# Patient Record
Sex: FEMALE | Race: BLACK OR AFRICAN AMERICAN | Employment: OTHER | URBAN - METROPOLITAN AREA
[De-identification: names, ages, dates, MRNs, and addresses within clinical notes are randomized per-mention and may not be internally consistent; named-entity substitution may affect disease eponyms.]

---

## 2017-02-13 DIAGNOSIS — E78.00 HYPERCHOLESTEREMIA: Chronic | ICD-10-CM

## 2017-02-13 RX ORDER — LOSARTAN POTASSIUM 100 MG/1
TABLET ORAL
Qty: 30 TAB | Refills: 4 | Status: SHIPPED | OUTPATIENT
Start: 2017-02-13 | End: 2017-04-28 | Stop reason: SDUPTHER

## 2017-02-13 RX ORDER — PROPRANOLOL HYDROCHLORIDE 120 MG/1
CAPSULE, EXTENDED RELEASE ORAL
Qty: 30 CAP | Refills: 4 | Status: SHIPPED | OUTPATIENT
Start: 2017-02-13 | End: 2017-09-28

## 2017-04-11 ENCOUNTER — OFFICE VISIT (OUTPATIENT)
Dept: INTERNAL MEDICINE CLINIC | Age: 82
End: 2017-04-11

## 2017-04-11 VITALS
TEMPERATURE: 97.6 F | RESPIRATION RATE: 18 BRPM | HEIGHT: 59 IN | HEART RATE: 53 BPM | DIASTOLIC BLOOD PRESSURE: 87 MMHG | WEIGHT: 184.6 LBS | SYSTOLIC BLOOD PRESSURE: 243 MMHG | BODY MASS INDEX: 37.21 KG/M2 | OXYGEN SATURATION: 100 %

## 2017-04-11 DIAGNOSIS — E78.00 HYPERCHOLESTEREMIA: Chronic | ICD-10-CM

## 2017-04-11 DIAGNOSIS — I10 ESSENTIAL HYPERTENSION: Primary | Chronic | ICD-10-CM

## 2017-04-11 RX ORDER — CLONIDINE HYDROCHLORIDE 0.1 MG/1
0.1 TABLET ORAL ONCE
Qty: 1 TAB | Refills: 0
Start: 2017-04-11 | End: 2017-04-11

## 2017-04-11 NOTE — PROGRESS NOTES
HISTORY OF PRESENT ILLNESS  Argenis Del Rio is a 80 y.o. female. Visit Vitals    BP (!) 243/87    Pulse (!) 53    Temp 97.6 °F (36.4 °C) (Oral)    Resp 18    Ht 4' 11\" (1.499 m)    Wt 184 lb 9.6 oz (83.7 kg)    SpO2 100%    BMI 37.28 kg/m2       HPI Comments: Has a new pharmacy. Medication Evaluation   The history is provided by the patient. This is a new problem. Pertinent negatives include no chest pain, no headaches and no shortness of breath. Hypertension    The history is provided by the patient (didn't take her morning meds). This is a chronic problem. The current episode started more than 1 week ago. The problem has not changed since onset. Pertinent negatives include no chest pain, no palpitations, no headaches, no dizziness and no shortness of breath. Risk factors include postmenopause, obesity, a sedentary lifestyle and hypertension. Review of Systems   Constitutional: Negative. Respiratory: Negative for shortness of breath. Cardiovascular: Negative for chest pain and palpitations. Neurological: Negative for dizziness and headaches. Physical Exam   Constitutional: She is oriented to person, place, and time. She appears well-developed and well-nourished. No distress. Cardiovascular: Normal rate and regular rhythm. Pulmonary/Chest: Effort normal and breath sounds normal.   Musculoskeletal: She exhibits no edema. Neurological: She is alert and oriented to person, place, and time. Skin: Skin is warm and dry. She is not diaphoretic. Psychiatric: She has a normal mood and affect. Nursing note and vitals reviewed. ASSESSMENT and PLAN    ICD-10-CM ICD-9-CM    1. Essential hypertension I10 401.9 cloNIDine HCl (CATAPRES) 0.1 mg tablet      METABOLIC PANEL, COMPREHENSIVE      LIPID PANEL      URINALYSIS W/ RFLX MICROSCOPIC   2. Hypercholesteremia E78.00 272.0 LIPID PANEL     BP systolic dangerously high. Will give dose of clonidine in the office.      Reinforced the importance of taking her meds    Update lab--needs to go to [facility] per pt    F/u one month        See nurse note.  BP responded to clonidine in office--186/64 so pt allowed to leave with instructions to take her medication when she gets home

## 2017-04-11 NOTE — MR AVS SNAPSHOT
Visit Information Date & Time Provider Department Dept. Phone Encounter #  
 4/11/2017 11:15 AM Tatyana Carlson, [address] [id number] Follow-up Instructions Return in about 1 month (around 5/11/2017) for htn. Upcoming Health Maintenance Date Due  
 GLAUCOMA SCREENING Q2Y 8/15/1997 ZOSTER VACCINE AGE 60> 8/1/2017* DTaP/Tdap/Td series (1 - Tdap) 8/1/2017* Pneumococcal 65+ Low/Medium Risk (2 of 2 - PPSV23) 9/13/2017 MEDICARE YEARLY EXAM 9/14/2017 *Topic was postponed. The date shown is not the original due date. Allergies as of 4/11/2017  Review Complete On: 4/11/2017 By: Tatyana Carlson MD  
  
 Severity Noted Reaction Type Reactions Penicillins  08/01/2016    Other (comments) Passed out Statins-hmg-coa Reductase Inhibitors  08/01/2016    Myalgia Tried multiple statins Current Immunizations  Never Reviewed No immunizations on file. Not reviewed this visit You Were Diagnosed With   
  
 Codes Comments Essential hypertension    -  Primary ICD-10-CM: I10 
ICD-9-CM: 401.9 Hypercholesteremia     ICD-10-CM: E78.00 ICD-9-CM: 272.0 Vitals BP Pulse Temp Resp Height(growth percentile) Weight(growth percentile) (!) 243/87 (!) 53 97.6 °F (36.4 °C) (Oral) 18 4' 11\" (1.499 m) 184 lb 9.6 oz (83.7 kg) SpO2 BMI OB Status Smoking Status 100% 37.28 kg/m2 Hysterectomy Former Smoker Vitals History BMI and BSA Data Body Mass Index Body Surface Area  
 37.28 kg/m 2 1.87 m 2 Preferred Pharmacy Pharmacy Name Phone [id number] [address] [facility], [address] [place] [address] [phone] Your Updated Medication List  
  
   
This list is accurate as of: 4/11/17 12:03 PM.  Always use your most recent med list.  
  
  
  
  
 aspirin delayed-release 81 mg tablet Take  by mouth. Pt takes 2-3 times a week  
  
 cloNIDine HCl 0.1 mg tablet Commonly known as:  CATAPRES Take 1 Tab by mouth once for 1 dose. Indications: hypertension  
  
 hydrALAZINE 50 mg tablet Commonly known as:  APRESOLINE Take 1 Tab by mouth two (2) times a day. Indications: HYPERTENSION  
  
 losartan 100 mg tablet Commonly known as:  COZAAR  
TAKE 1 TAB BY MOUTH DAILY. INDICATIONS: HYPERTENSION  
  
 multivitamin tablet Commonly known as:  ONE A DAY Take  by Mouth Once a Day. omega-3 fatty acids-vitamin e 1,000 mg Cap  
1,000 mg.  
  
 prednisoLONE acetate 1 % ophthalmic suspension Commonly known as:  PRED FORTE  
  
 propranolol  mg SR capsule Commonly known as:  INDERAL LA  
TAKE 1 CAP BY MOUTH DAILY. INDICATIONS: HYPERTENSION Follow-up Instructions Return in about 1 month (around 5/11/2017) for htn. To-Do List   
 04/11/2017 Lab:  LIPID PANEL   
  
 04/11/2017 Lab:  METABOLIC PANEL, COMPREHENSIVE   
  
 04/11/2017 Lab:  URINALYSIS W/ RFLX MICROSCOPIC Introducing [facility] & HEALTH SERVICES! [facility] introduces Propable patient portal. Now you can access parts of your medical record, email your doctor's office, and request medication refills online. 1. In your internet browser, go to [email or web address] 2. Click on the First Time User? Click Here link in the Sign In box. You will see the New Member Sign Up page. 3. Enter your Propable Access Code exactly as it appears below. You will not need to use this code after youve completed the sign-up process. If you do not sign up before the expiration date, you must request a new code. · Propable Access Code: Halima Wood Expires: 7/10/2017 12:03 PM 
 
4. Enter the last four digits of your Social Security Number (xxxx) and Date of Birth (mm/dd/yyyy) as indicated and click Submit. You will be taken to the next sign-up page. 5. Create a Propable ID.  This will be your Propable login ID and cannot be changed, so think of one that is secure and easy to remember. 6. Create a Sookbox password. You can change your password at any time. 7. Enter your Password Reset Question and Answer. This can be used at a later time if you forget your password. 8. Enter your e-mail address. You will receive e-mail notification when new information is available in [address] 9. Click Sign Up. You can now view and download portions of your medical record. 10. Click the Download Summary menu link to download a portable copy of your medical information. If you have questions, please visit the Frequently Asked Questions section of the Sookbox website. Remember, Sookbox is NOT to be used for urgent needs. For medical emergencies, dial 911. Now available from your iPhone and Android! Please provide this summary of care documentation to your next provider. If you have any questions after today's visit, please call [phone].

## 2017-04-11 NOTE — PROGRESS NOTES
Chief Complaint   Patient presents with    Medication Evaluation       Pt preferred language for health care discussion is english. Is someone accompanying this pt? no    Is the patient using any DME equipment during OV? no    Depression Screening completed. Yes    Learning Assessment completed. Yes    Abuse Screening completed. Yes    Health Maintenance reviewed and discussed per provider. Yes    Pt is due for Bone Density, Eye exam.  Please order/place referral if appropriate. Advance Directive:  1. Do you have an advance directive in place? Patient Reply:no    2. If not, would you like material regarding how to put one in place? Patient Reply: No    Coordination of Care:  1. Have you been to the ER, urgent care clinic since your last visit? Hospitalized since your last visit? no    2. Have you seen or consulted any other health care providers outside of the [facility] since your last visit? Include any pap smears or colon screening. no      Patient given 0.1 mg of Clonidine, BP rechecked in 15 min 207/70. Rechecked in another 10 min 233/68. Another 0.1 mg Clonidine given, BP rechecked in 25 min 186/64. Pt released from the clinic.

## 2017-05-11 ENCOUNTER — OFFICE VISIT (OUTPATIENT)
Dept: INTERNAL MEDICINE CLINIC | Age: 82
End: 2017-05-11

## 2017-05-11 VITALS
DIASTOLIC BLOOD PRESSURE: 78 MMHG | HEART RATE: 52 BPM | BODY MASS INDEX: 37.9 KG/M2 | RESPIRATION RATE: 18 BRPM | WEIGHT: 188 LBS | HEIGHT: 59 IN | OXYGEN SATURATION: 99 % | TEMPERATURE: 97.7 F | SYSTOLIC BLOOD PRESSURE: 214 MMHG

## 2017-05-11 DIAGNOSIS — E78.00 HYPERCHOLESTEREMIA: Chronic | ICD-10-CM

## 2017-05-11 DIAGNOSIS — I10 ESSENTIAL HYPERTENSION: Primary | Chronic | ICD-10-CM

## 2017-05-11 RX ORDER — FUROSEMIDE 20 MG/1
20 TABLET ORAL DAILY
Qty: 30 TAB | Refills: 5 | Status: SHIPPED | OUTPATIENT
Start: 2017-05-11 | End: 2017-06-22

## 2017-05-11 NOTE — PROGRESS NOTES
Chief Complaint   Patient presents with    Hypertension       Pt preferred language for health care discussion is english. Is someone accompanying this pt? no    Is the patient using any DME equipment during OV? no    Depression Screening completed. Yes    Learning Assessment completed. Yes    Abuse Screening completed. Yes    Health Maintenance reviewed and discussed per provider. Yes    Pt is due for Eye exam.  Please order/place referral if appropriate. Advance Directive:  1. Do you have an advance directive in place? Patient Reply:no    2. If not, would you like material regarding how to put one in place? Patient Reply: No    Coordination of Care:  1. Have you been to the ER, urgent care clinic since your last visit? Hospitalized since your last visit? no    2. Have you seen or consulted any other health care providers outside of the [address] [place] [address] since your last visit? Include any pap smears or colon screening.  no

## 2017-05-11 NOTE — PROGRESS NOTES
HISTORY OF PRESENT ILLNESS  Queen Jaiden is a 80 y.o. female. Blood pressure (!) 214/78, pulse (!) 52, temperature 97.7 °F (36.5 °C), temperature source Oral, resp. rate 18, height 4' 11\" (1.499 m), weight 188 lb (85.3 kg), SpO2 99 %. HPI Comments: She was seeing Dr. Gaitan Situ  We have tried to get her records and have been unable to get them. So we have no way of knowing what she did --what meds she has tried, what tests she had done, what health maintenance she had    She had lab done via [facility]. None done via [facility] or [facility]. Hypertension    The history is provided by the patient. This is a chronic problem. The current episode started more than 1 week ago. The problem has not changed since onset. Pertinent negatives include no chest pain, no blurred vision, no headaches, no dizziness and no shortness of breath. There are no associated agents to hypertension. Risk factors include postmenopause, obesity, a sedentary lifestyle and family history. Review of Systems   Constitutional: Negative. Eyes: Negative for blurred vision. Respiratory: Negative for shortness of breath. Cardiovascular: Negative. Negative for chest pain. Neurological: Negative for dizziness, focal weakness and headaches. Hydralazine makes her a little dizzy       Physical Exam   Constitutional: She is oriented to person, place, and time. She appears well-developed and well-nourished. No distress. Cardiovascular: Normal rate and regular rhythm. Pulmonary/Chest: Effort normal and breath sounds normal.   Musculoskeletal: She exhibits edema. Neurological: She is alert and oriented to person, place, and time. Skin: Skin is warm and dry. She is not diaphoretic. Psychiatric: She has a normal mood and affect. Nursing note and vitals reviewed. ASSESSMENT and PLAN    ICD-10-CM ICD-9-CM    1.  Essential hypertension J08 232.0 METABOLIC PANEL, COMPREHENSIVE      LIPID PANEL      URINALYSIS W/ RFLX MICROSCOPIC      TSH AND FREE T4      MAGNESIUM      AMB POC EKG ROUTINE W/ 12 LEADS, INTER & REP   2. Hypercholesteremia R70.16 930.4 METABOLIC PANEL, COMPREHENSIVE      LIPID PANEL      AMB POC EKG ROUTINE W/ 12 LEADS, INTER & REP       Since we have no lab or old records, we will have to start over. Order lab and EKG today  At Dr. Rock Jones office her BPs were done by hand  Has isolated systolic BP. Stop the hydralazine--she did not take it regularly anyway due to dizziness  Consider amlodipine or procardia. Or minoxidil    But she is not on a diuretic--so will start lasix 20 mg daily    Discussed weight, lifestyle, diet and exercise. She says she has had a weight problem all her life    F/u 6 weeks for recheck an BP           Addendum--her EKG does show bradycardia and probable LVH. She is on propranolol.  This may need to be adjusted, but pt denies any sxs currently

## 2017-05-11 NOTE — MR AVS SNAPSHOT
Visit Information Date & Time Provider Department Dept. Phone Encounter #  
 5/11/2017 11:15 AM Renetta Poole, [address] [facility] [id number] Follow-up Instructions Return in about 6 weeks (around 6/22/2017) for htn, cholesterol. Upcoming Health Maintenance Date Due  
 GLAUCOMA SCREENING Q2Y 8/15/1997 ZOSTER VACCINE AGE 60> 8/1/2017* DTaP/Tdap/Td series (1 - Tdap) 8/1/2017* INFLUENZA AGE 9 TO ADULT 8/1/2017 Pneumococcal 65+ Low/Medium Risk (2 of 2 - PPSV23) 9/13/2017 MEDICARE YEARLY EXAM 9/14/2017 *Topic was postponed. The date shown is not the original due date. Allergies as of 5/11/2017  Review Complete On: 5/11/2017 By: Renetta Poole MD  
  
 Severity Noted Reaction Type Reactions Penicillins  08/01/2016    Other (comments) Passed out Statins-hmg-coa Reductase Inhibitors  08/01/2016    Myalgia Tried multiple statins Current Immunizations  Never Reviewed No immunizations on file. Not reviewed this visit You Were Diagnosed With   
  
 Codes Comments Essential hypertension    -  Primary ICD-10-CM: I10 
ICD-9-CM: 401.9 Hypercholesteremia     ICD-10-CM: E78.00 ICD-9-CM: 272.0 Vitals BP Pulse Temp Resp Height(growth percentile) Weight(growth percentile) (!) 226/77 (!) 52 97.7 °F (36.5 °C) (Oral) 18 4' 11\" (1.499 m) 188 lb (85.3 kg) SpO2 BMI OB Status Smoking Status 99% 37.97 kg/m2 Hysterectomy Former Smoker Vitals History BMI and BSA Data Body Mass Index Body Surface Area  
 37.97 kg/m 2 1.88 m 2 Preferred Pharmacy Pharmacy Name Phone [id number], [address] [place] [address] 29 [phone] Your Updated Medication List  
  
   
This list is accurate as of: 5/11/17 11:20 AM.  Always use your most recent med list.  
  
  
  
  
 aspirin delayed-release 81 mg tablet Take  by mouth. Pt takes 2-3 times a week  
  
 furosemide 20 mg tablet Commonly known as:  LASIX Take 1 Tab by mouth daily. losartan 100 mg tablet Commonly known as:  COZAAR Take 1 Tab by mouth daily. multivitamin tablet Commonly known as:  ONE A DAY Take  by Mouth Once a Day. omega-3 fatty acids-vitamin e 1,000 mg Cap  
1,000 mg.  
  
 prednisoLONE acetate 1 % ophthalmic suspension Commonly known as:  PRED FORTE  
  
 propranolol  mg SR capsule Commonly known as:  INDERAL LA  
TAKE 1 CAP BY MOUTH DAILY. INDICATIONS: HYPERTENSION Prescriptions Sent to Pharmacy Refills  
 furosemide (LASIX) 20 mg tablet 5 Sig: Take 1 Tab by mouth daily. Class: Normal  
 Pharmacy: [address] [place] [address], [address] [place] [address], 29  #: [phone] Route: Oral  
  
We Performed the Following AMB POC EKG ROUTINE W/ 12 LEADS, INTER & REP [62836 CPT(R)] Follow-up Instructions Return in about 6 weeks (around 6/22/2017) for htn, cholesterol. To-Do List   
 05/11/2017 Lab:  LIPID PANEL   
  
 05/11/2017 Lab:  MAGNESIUM   
  
 05/11/2017 Lab:  METABOLIC PANEL, COMPREHENSIVE   
  
 05/11/2017 Lab:  TSH AND FREE T4   
  
 05/11/2017 Lab:  URINALYSIS W/ RFLX MICROSCOPIC Introducing [facility] & HEALTH SERVICES! Katheryn Roger introduces VipVenta patient portal. Now you can access parts of your medical record, email your doctor's office, and request medication refills online. 1. In your internet browser, go to [email or web address] 2. Click on the First Time User? Click Here link in the Sign In box. You will see the New Member Sign Up page. 3. Enter your VipVenta Access Code exactly as it appears below. You will not need to use this code after youve completed the sign-up process. If you do not sign up before the expiration date, you must request a new code. · VipVenta Access Code: Nilton Morelos Expires: 7/10/2017 12:03 PM 
 
4. Enter the last four digits of your Social Security Number (xxxx) and Date of Birth (mm/dd/yyyy) as indicated and click Submit. You will be taken to the next sign-up page. 5. Create a Otelic ID. This will be your Otelic login ID and cannot be changed, so think of one that is secure and easy to remember. 6. Create a Otelic password. You can change your password at any time. 7. Enter your Password Reset Question and Answer. This can be used at a later time if you forget your password. 8. Enter your e-mail address. You will receive e-mail notification when new information is available in [address] 9. Click Sign Up. You can now view and download portions of your medical record. 10. Click the Download Summary menu link to download a portable copy of your medical information. If you have questions, please visit the Frequently Asked Questions section of the Otelic website. Remember, Otelic is NOT to be used for urgent needs. For medical emergencies, dial 911. Now available from your iPhone and Android! Please provide this summary of care documentation to your next provider. If you have any questions after today's visit, please call [phone].

## 2017-06-22 ENCOUNTER — OFFICE VISIT (OUTPATIENT)
Dept: INTERNAL MEDICINE CLINIC | Age: 82
End: 2017-06-22

## 2017-06-22 VITALS
HEIGHT: 59 IN | HEART RATE: 51 BPM | RESPIRATION RATE: 16 BRPM | SYSTOLIC BLOOD PRESSURE: 171 MMHG | DIASTOLIC BLOOD PRESSURE: 68 MMHG | WEIGHT: 187 LBS | TEMPERATURE: 97.3 F | BODY MASS INDEX: 37.7 KG/M2

## 2017-06-22 DIAGNOSIS — I10 ESSENTIAL HYPERTENSION: Primary | Chronic | ICD-10-CM

## 2017-06-22 DIAGNOSIS — E78.00 HYPERCHOLESTEREMIA: Chronic | ICD-10-CM

## 2017-06-22 RX ORDER — AMLODIPINE AND VALSARTAN 5; 320 MG/1; MG/1
1 TABLET ORAL DAILY
Qty: 30 TAB | Refills: 5 | Status: SHIPPED | OUTPATIENT
Start: 2017-06-22 | End: 2017-09-14 | Stop reason: SDUPTHER

## 2017-06-22 NOTE — PROGRESS NOTES
Chief Complaint   Patient presents with    Hypertension       Pt preferred language for health care discussion is [place]. Is someone accompanying this pt? no    Is the patient using any DME equipment during [facility]? cane    Depression Screening:  PHQ over the last two weeks 6/22/2017 5/11/2017 4/11/2017 9/13/2016 8/1/2016   Little interest or pleasure in doing things Not at all Not at all Not at all Not at all Not at all   Feeling down, depressed or hopeless Not at all Not at all Not at all Not at all Not at all   Total Score PHQ 2 0 0 0 0 0       Learning Assessment:  Learning Assessment 8/1/2016   PRIMARY LEARNER Patient   HIGHEST LEVEL OF EDUCATION - PRIMARY LEARNER  GRADUATED HIGH SCHOOL OR GED   PRIMARY LANGUAGE ENGLISH   LEARNER PREFERENCE PRIMARY DEMONSTRATION   ANSWERED BY MARGIE Robbins   RELATIONSHIP SELF       Abuse Screening:  Abuse Screening Questionnaire 8/1/2016   Do you ever feel afraid of your partner? N   Are you in a relationship with someone who physically or mentally threatens you? N   Is it safe for you to go home? Y       Fall Risk  Fall Risk Assessment, last 12 mths 6/22/2017 5/11/2017 4/11/2017 9/13/2016 8/1/2016   Able to walk? Yes Yes Yes Yes Yes   Fall in past 12 months? No No No No Yes   Fall with injury? - - - - No   Number of falls in past 12 months - - - - 3   Fall Risk Score - - - - 3         Health Maintenance reviewed and discussed per provider. Yes    Pt is due for Eye exam (form faxed to Dr. Silvestre Rodriguez). Please order/place referral if appropriate. Advance Directive:  1. Do you have an advance directive in place? Patient Reply:Yes, daughters have    2. If not, would you like material regarding how to put one in place? Patient Reply: no    Coordination of Care:  1. Have you been to the ER, urgent care clinic since your last visit? Hospitalized since your last visit? no    2.  Have you seen or consulted any other health care providers outside of the [address] [place] [address] since your last visit? Include any pap smears or colon screening.  no

## 2017-06-22 NOTE — PROGRESS NOTES
HISTORY OF PRESENT ILLNESS  Micheal Conde is a 80 y.o. female. Visit Vitals    /68    Pulse (!) 51    Temp 97.3 °F (36.3 °C) (Oral)    Resp 16    Ht 4' 11\" (1.499 m)    Wt 187 lb (84.8 kg)    BMI 37.77 kg/m2       HPI Comments: Pt says she had a reaction to furosemide--she had swelling of her hands. Her BP is better this time. We have never gotten records from Dr. Whitman Meigs . Hypertension    The history is provided by the patient. This is a chronic problem. The current episode started more than 1 week ago. The problem has been gradually improving. Pertinent negatives include no chest pain, no palpitations, no headaches and no dizziness. Risk factors include dyslipidemia and postmenopause. Review of Systems   Constitutional: Negative for chills and fever. Cardiovascular: Negative for chest pain and palpitations. Neurological: Negative for dizziness and headaches. Physical Exam   Constitutional: She is oriented to person, place, and time. She appears well-developed and well-nourished. No distress. Cardiovascular: Normal rate and regular rhythm. Pulmonary/Chest: Effort normal and breath sounds normal.   Musculoskeletal: She exhibits edema. Neurological: She is alert and oriented to person, place, and time. Skin: Skin is warm and dry. She is not diaphoretic. Psychiatric: She has a normal mood and affect. Nursing note and vitals reviewed. ASSESSMENT and PLAN    ICD-10-CM ICD-9-CM    1. Essential hypertension I10 401.9 amLODIPine-valsartan (EXFORGE) 5-320 mg per tablet   2. Hypercholesteremia E78.00 272.0        BP up but better.   Will try changing to exforge from losartan--one pill swap which may work better for her    Continue same otherwise    F/u 6-8 weeks for recheck on BP

## 2017-08-03 ENCOUNTER — OFFICE VISIT (OUTPATIENT)
Dept: INTERNAL MEDICINE CLINIC | Age: 82
End: 2017-08-03

## 2017-08-03 VITALS
WEIGHT: 184 LBS | RESPIRATION RATE: 16 BRPM | DIASTOLIC BLOOD PRESSURE: 85 MMHG | HEIGHT: 59 IN | OXYGEN SATURATION: 99 % | BODY MASS INDEX: 37.09 KG/M2 | SYSTOLIC BLOOD PRESSURE: 176 MMHG | TEMPERATURE: 96.2 F | HEART RATE: 58 BPM

## 2017-08-03 DIAGNOSIS — I10 ESSENTIAL HYPERTENSION: Primary | Chronic | ICD-10-CM

## 2017-08-03 DIAGNOSIS — E78.00 HYPERCHOLESTEREMIA: Chronic | ICD-10-CM

## 2017-08-03 DIAGNOSIS — Z79.899 MEDICATION MANAGEMENT: ICD-10-CM

## 2017-08-03 RX ORDER — FUROSEMIDE 20 MG/1
TABLET ORAL
Refills: 5 | COMMUNITY
Start: 2017-06-08 | End: 2017-11-28

## 2017-08-03 NOTE — PROGRESS NOTES
HISTORY OF PRESENT ILLNESS  Mukesh Boyd is a 80 y.o. female. Visit Vitals    /85 (BP 1 Location: Left arm)    Pulse (!) 58    Temp 96.2 °F (35.7 °C) (Oral)    Resp 16    Ht 4' 11\" (1.499 m)    Wt 184 lb (83.5 kg)    SpO2 99%    BMI 37.16 kg/m2       HPI Comments: Pt is confused re her meds. She is only taking exforge. Hydralazine was stopped. She is not taking the lasix? ? She is not taking propranolol--but is not clear is she was still to be on it? ?    We never got any records from Dr. Vidales Rad her prior PCP. So we really do not know what she has tried or what tests she has had done. Hypertension    The history is provided by the patient (see comments). This is a chronic problem. The current episode started more than 1 week ago. The problem has not changed since onset. Pertinent negatives include no chest pain, no palpitations, no headaches, no dizziness (resolved) and no shortness of breath. There are no associated agents to hypertension. Risk factors include postmenopause, hypertension, a sedentary lifestyle and obesity. Cholesterol Problem   Pertinent negatives include no chest pain, no headaches and no shortness of breath. Review of Systems   Constitutional: Negative. Respiratory: Negative for shortness of breath. Cardiovascular: Negative for chest pain and palpitations. Neurological: Negative for dizziness (resolved) and headaches. Physical Exam   Constitutional: She is oriented to person, place, and time. She appears well-developed and well-nourished. No distress. Cardiovascular: Normal rate and regular rhythm. Pulmonary/Chest: Effort normal and breath sounds normal.   Musculoskeletal: She exhibits edema (trace). Neurological: She is alert and oriented to person, place, and time. Skin: Skin is warm and dry. She is not diaphoretic. Psychiatric: She has a normal mood and affect. Nursing note and vitals reviewed.       ASSESSMENT and PLAN    ICD-10-CM ICD-9-CM    1. Essential hypertension I10 401.9    2. Hypercholesteremia E78.00 272.0    3. Medication management Z79.899 V58.69        Poorly controlled DM. Intolerant or resistant to meds (when she takes them). BP came down significantly after rest. But there is a marked difference between her arms. She describes having a test in the past which may have been a vascular test on her neck. Will continue same for now. Off lasix.  On exforge and inderal.    F/u 6 weeks for recheck

## 2017-08-03 NOTE — PROGRESS NOTES
ROOM #     Sara Kellogg presents today for   Chief Complaint   Patient presents with    Hypertension    Cholesterol Problem       Sara Kellogg preferred language for health care discussion is english. Pt stated she feel off toilet seat and had a bruise on right side of nose but did not go to ER after incident. Is someone accompanying this pt? No    Is the patient using any DME equipment during OV? No    Depression Screening:  PHQ over the last two weeks 8/3/2017 6/22/2017 5/11/2017 4/11/2017 9/13/2016 8/1/2016   Little interest or pleasure in doing things Not at all Not at all Not at all Not at all Not at all Not at all   Feeling down, depressed or hopeless Not at all Not at all Not at all Not at all Not at all Not at all   Total Score PHQ 2 0 0 0 0 0 0       Learning Assessment:  Learning Assessment 8/1/2016   PRIMARY LEARNER Patient   HIGHEST LEVEL OF EDUCATION - PRIMARY LEARNER  GRADUATED HIGH SCHOOL OR GED   PRIMARY LANGUAGE ENGLISH   LEARNER PREFERENCE PRIMARY DEMONSTRATION   ANSWERED BY MARGIE Robbins   RELATIONSHIP SELF       Abuse Screening:  Abuse Screening Questionnaire 8/1/2016   Do you ever feel afraid of your partner? N   Are you in a relationship with someone who physically or mentally threatens you? N   Is it safe for you to go home? Y       Fall Risk  Fall Risk Assessment, last 12 mths 8/3/2017 6/22/2017 5/11/2017 4/11/2017 9/13/2016 8/1/2016   Able to walk? Yes Yes Yes Yes Yes Yes   Fall in past 12 months? Yes No No No No Yes   Fall with injury? Yes - - - - No   Number of falls in past 12 months 1 - - - - 3   Fall Risk Score 2 - - - - 3       Health Maintenance reviewed and discussed per provider. None due. Advance Directive:  1. Do you have an advance directive in place? Patient Reply:  Yes; but not on file     2. If not, would you like material regarding how to put one in place? Patient Reply: No    Coordination of Care:  1.  Have you been to the ER, urgent care clinic since your last visit? Hospitalized since your last visit? No    2. Have you seen or consulted any other health care providers outside of the [address] [place] [address] since your last visit? No

## 2017-08-03 NOTE — MR AVS SNAPSHOT
Visit Information Date & Time Provider Department Dept. Phone Encounter #  
 8/3/2017 12:00 PM Kassandradara IbarraInfer6 [id number] Follow-up Instructions Return in about 8 weeks (around 9/28/2017) for htn, cholesterol. Upcoming Health Maintenance Date Due INFLUENZA AGE 9 TO ADULT 9/15/2017* ZOSTER VACCINE AGE 60> 4/29/2020* DTaP/Tdap/Td series (1 - Tdap) 4/29/2020* Pneumococcal 65+ Low/Medium Risk (2 of 2 - PPSV23) 9/13/2017 MEDICARE YEARLY EXAM 9/14/2017 GLAUCOMA SCREENING Q2Y 3/28/2019 *Topic was postponed. The date shown is not the original due date. Allergies as of 8/3/2017  Review Complete On: 8/3/2017 By: Nirav Ibarra MD  
  
 Severity Noted Reaction Type Reactions Penicillins  08/01/2016    Other (comments) Passed out Statins-hmg-coa Reductase Inhibitors  08/01/2016    Myalgia Tried multiple statins Current Immunizations  Never Reviewed No immunizations on file. Not reviewed this visit You Were Diagnosed With   
  
 Codes Comments Essential hypertension    -  Primary ICD-10-CM: I10 
ICD-9-CM: 401.9 Hypercholesteremia     ICD-10-CM: E78.00 ICD-9-CM: 272.0 Medication management     ICD-10-CM: Z79.899 ICD-9-CM: V58.69 Vitals BP Pulse Temp Resp Height(growth percentile) Weight(growth percentile) 176/85 (BP 1 Location: Left arm) (!) 58 96.2 °F (35.7 °C) (Oral) 16 4' 11\" (1.499 m) 184 lb (83.5 kg) SpO2 BMI OB Status Smoking Status 99% 37.16 kg/m2 Hysterectomy Former Smoker Vitals History BMI and BSA Data Body Mass Index Body Surface Area  
 37.16 kg/m 2 1.86 m 2 Preferred Pharmacy Pharmacy Name Phone [id number] [phone], [address] [place] [address] [phone] Your Updated Medication List  
  
   
This list is accurate as of: 8/3/17 12:36 PM.  Always use your most recent med list. amLODIPine-valsartan 5-320 mg per tablet Commonly known as:  Alvester Lime Take 1 Tab by mouth daily. aspirin delayed-release 81 mg tablet Take  by mouth. Pt takes 2-3 times a week  
  
 furosemide 20 mg tablet Commonly known as:  LASIX TAKE 1 TAB BY MOUTH DAILY. multivitamin tablet Commonly known as:  ONE A DAY Take  by Mouth Once a Day. omega-3 fatty acids-vitamin e 1,000 mg Cap  
1,000 mg.  
  
 prednisoLONE acetate 1 % ophthalmic suspension Commonly known as:  PRED FORTE  
  
 propranolol  mg SR capsule Commonly known as:  INDERAL LA  
TAKE 1 CAP BY MOUTH DAILY. INDICATIONS: HYPERTENSION Follow-up Instructions Return in about 8 weeks (around 9/28/2017) for htn, cholesterol. Introducing [facility] & HEALTH SERVICES! [employer] introduces [employer]ai patient portal. Now you can access parts of your medical record, email your doctor's office, and request medication refills online. 1. In your internet browser, go to [email or web address]. [employer]/Jang[employer] SMS 2. Click on the First Time User? Click Here link in the Sign In box. You will see the New Member Sign Up page. 3. Enter your Locai Access Code exactly as it appears below. You will not need to use this code after youve completed the sign-up process. If you do not sign up before the expiration date, you must request a new code. · Locai Access Code: [email or web address] Expires: 11/1/2017 12:36 PM 
 
4. Enter the last four digits of your Social Security Number (xxxx) and Date of Birth (mm/dd/yyyy) as indicated and click Submit. You will be taken to the next sign-up page. 5. Create a Affordit.comt ID. This will be your Locai login ID and cannot be changed, so think of one that is secure and easy to remember. 6. Create a Locai password. You can change your password at any time. 7. Enter your Password Reset Question and Answer.  This can be used at a later time if you forget your password. 8. Enter your e-mail address. You will receive e-mail notification when new information is available in [address] 9. Click Sign Up. You can now view and download portions of your medical record. 10. Click the Download Summary menu link to download a portable copy of your medical information. If you have questions, please visit the Frequently Asked Questions section of the [employer] website. Remember, [employer] is NOT to be used for urgent needs. For medical emergencies, dial 911. Now available from your iPhone and Android! Please provide this summary of care documentation to your next provider. Your primary care clinician is listed as [facility]. If you have any questions after today's visit, please call [phone].

## 2017-09-14 DIAGNOSIS — I10 ESSENTIAL HYPERTENSION: Chronic | ICD-10-CM

## 2017-09-14 RX ORDER — AMLODIPINE AND VALSARTAN 5; 320 MG/1; MG/1
1 TABLET ORAL DAILY
Qty: 30 TAB | Refills: 5 | Status: SHIPPED | OUTPATIENT
Start: 2017-09-14 | End: 2017-09-28 | Stop reason: DRUGHIGH

## 2017-09-14 NOTE — TELEPHONE ENCOUNTER
Pharmacy requesting NINETY (90) day supply of following medication:    Requested Prescriptions     Pending Prescriptions Disp Refills    amLODIPine-valsartan (EXFORGE) 5-320 mg per tablet 30 Tab 5     Sig: Take 1 Tab by mouth daily.

## 2017-09-28 ENCOUNTER — OFFICE VISIT (OUTPATIENT)
Dept: INTERNAL MEDICINE CLINIC | Age: 82
End: 2017-09-28

## 2017-09-28 VITALS
WEIGHT: 185.6 LBS | BODY MASS INDEX: 37.42 KG/M2 | RESPIRATION RATE: 18 BRPM | OXYGEN SATURATION: 92 % | SYSTOLIC BLOOD PRESSURE: 181 MMHG | TEMPERATURE: 96.9 F | HEART RATE: 52 BPM | HEIGHT: 59 IN | DIASTOLIC BLOOD PRESSURE: 76 MMHG

## 2017-09-28 DIAGNOSIS — I10 ESSENTIAL HYPERTENSION: Primary | Chronic | ICD-10-CM

## 2017-09-28 DIAGNOSIS — E78.00 HYPERCHOLESTEREMIA: Chronic | ICD-10-CM

## 2017-09-28 DIAGNOSIS — Z79.899 MEDICATION MANAGEMENT: ICD-10-CM

## 2017-09-28 DIAGNOSIS — G45.9 TRANSIENT CEREBRAL ISCHEMIA, UNSPECIFIED TYPE: ICD-10-CM

## 2017-09-28 RX ORDER — AMLODIPINE AND VALSARTAN 10; 320 MG/1; MG/1
1 TABLET ORAL DAILY
Qty: 30 TAB | Refills: 2 | Status: SHIPPED | OUTPATIENT
Start: 2017-09-28 | End: 2017-11-28 | Stop reason: SINTOL

## 2017-09-28 NOTE — MR AVS SNAPSHOT
Visit Information Date & Time Provider Department Dept. Phone Encounter #  
 9/28/2017 12:00 PM Talib Scott, [address] [id number] Follow-up Instructions Return in about 5 weeks (around 11/2/2017) for Medicare Wellness Visit, htn. Upcoming Health Maintenance Date Due  
 MEDICARE YEARLY EXAM 9/14/2017 ZOSTER VACCINE AGE 60> 4/29/2020* DTaP/Tdap/Td series (1 - Tdap) 4/29/2020* GLAUCOMA SCREENING Q2Y 3/28/2019 *Topic was postponed. The date shown is not the original due date. Allergies as of 9/28/2017  Review Complete On: 9/28/2017 By: Talib Scott MD  
  
 Severity Noted Reaction Type Reactions Penicillins  08/01/2016    Other (comments) Passed out Statins-hmg-coa Reductase Inhibitors  08/01/2016    Myalgia Tried multiple statins Current Immunizations  Never Reviewed No immunizations on file. Not reviewed this visit You Were Diagnosed With   
  
 Codes Comments Essential hypertension    -  Primary ICD-10-CM: I10 
ICD-9-CM: 401.9 Hypercholesteremia     ICD-10-CM: E78.00 ICD-9-CM: 272.0 Transient cerebral ischemia, unspecified type     ICD-10-CM: G45.9 ICD-9-CM: 435.9 Medication management     ICD-10-CM: Z79.899 ICD-9-CM: V58.69 Vitals BP Pulse Temp Resp Height(growth percentile) Weight(growth percentile) 181/76 (!) 52 96.9 °F (36.1 °C) (Oral) 18 4' 11\" (1.499 m) 185 lb 9.6 oz (84.2 kg) SpO2 BMI OB Status Smoking Status 92% 37.49 kg/m2 Hysterectomy Former Smoker Vitals History BMI and BSA Data Body Mass Index Body Surface Area  
 37.49 kg/m 2 1.87 m 2 Preferred Pharmacy Pharmacy Name Phone [phone] [phone] [phone] Your Updated Medication List  
  
   
This list is accurate as of: 9/28/17 12:20 PM.  Always use your most recent med list.  
  
  
  
  
 amLODIPine-valsartan  mg per tablet Commonly known as:  Eve Bebeto Take 1 Tab by mouth daily. aspirin delayed-release 81 mg tablet Take  by mouth. Pt takes 2-3 times a week  
  
 furosemide 20 mg tablet Commonly known as:  LASIX TAKE 1 TAB BY MOUTH DAILY. multivitamin tablet Commonly known as:  ONE A DAY Take  by Mouth Once a Day. omega-3 fatty acids-vitamin e 1,000 mg Cap  
1,000 mg.  
  
 prednisoLONE acetate 1 % ophthalmic suspension Commonly known as:  PRED FORTE Prescriptions Sent to Pharmacy Refills  
 amLODIPine-valsartan (EXFORGE)  mg per tablet 2 Sig: Take 1 Tab by mouth daily. Class: Normal  
 Pharmacy: [address] [facility], [address] [facility]  #: [phone] Route: Oral  
  
Follow-up Instructions Return in about 5 weeks (around 11/2/2017) for Medicare Wellness Visit, htn. To-Do List   
 09/28/2017 Imaging:  CT HEAD WO CONT Introducing [facility] & HEALTH SERVICES! [employer] Wilhelm introduces LumiFold patient portal. Now you can access parts of your medical record, email your doctor's office, and request medication refills online. 1. In your internet browser, go to https://Element ID. [employer] Corpt 2. Click on the First Time User? Click Here link in the Sign In box. You will see the New Member Sign Up page. 3. Enter your LumiFold Access Code exactly as it appears below. You will not need to use this code after youve completed the sign-up process. If you do not sign up before the expiration date, you must request a new code. · LumiFold Access Code: [email or web address] Expires: 11/1/2017 12:36 PM 
 
4. Enter the last four digits of your Social Security Number (xxxx) and Date of Birth (mm/dd/yyyy) as indicated and click Submit. You will be taken to the next sign-up page. 5. Create a LumiFold ID.  This will be your LumiFold login ID and cannot be changed, so think of one that is secure and easy to remember. 6. Create a [employer] password. You can change your password at any time. 7. Enter your Password Reset Question and Answer. This can be used at a later time if you forget your password. 8. Enter your e-mail address. You will receive e-mail notification when new information is available in [address] 9. Click Sign Up. You can now view and download portions of your medical record. 10. Click the Download Summary menu link to download a portable copy of your medical information. If you have questions, please visit the Frequently Asked Questions section of the [employer] website. Remember, [employer] is NOT to be used for urgent needs. For medical emergencies, dial 911. Now available from your iPhone and Android! Please provide this summary of care documentation to your next provider. Your primary care clinician is listed as [facility]. If you have any questions after today's visit, please call [phone].

## 2017-09-28 NOTE — PROGRESS NOTES
HISTORY OF PRESENT ILLNESS  Angela Rodriguez is a 80 y.o. female. Visit Vitals    /76    Pulse (!) 52    Temp 96.9 °F (36.1 °C) (Oral)    Resp 18    Ht 4' 11\" (1.499 m)    Wt 185 lb 9.6 oz (84.2 kg)    SpO2 92%    BMI 37.49 kg/m2       HPI Comments: In July she had an episode of where her left arm felt numb and \"limp\"  Her daughter told her recently that the left side of her face looked \"different. \"    She had carotid PVL 4/20/16 that did not find anything significant    Hypertension    The history is provided by the patient. This is a chronic problem. The current episode started more than 1 week ago. The problem has not changed since onset. Pertinent negatives include no headaches and no dizziness. There are no associated agents to hypertension. Risk factors include postmenopause and obesity. Cholesterol Problem   The history is provided by the patient. This is a chronic problem. The current episode started more than 1 week ago. The problem occurs daily. Pertinent negatives include no headaches. Review of Systems   Constitutional: Negative. Negative for chills and fever. Respiratory: Negative. Cardiovascular: Negative. Neurological: Positive for sensory change and focal weakness. Negative for dizziness and headaches. Physical Exam   Constitutional: She is oriented to person, place, and time. She appears well-developed and well-nourished. No distress. Cardiovascular: Normal rate and regular rhythm. Pulmonary/Chest: Effort normal and breath sounds normal.   Musculoskeletal: She exhibits edema (1+). Neurological: She is alert and oriented to person, place, and time. Skin: Skin is warm and dry. She is not diaphoretic. Psychiatric: She has a normal mood and affect. Nursing note and vitals reviewed. ASSESSMENT and PLAN    ICD-10-CM ICD-9-CM    1. Essential hypertension I10 401.9 amLODIPine-valsartan (EXFORGE)  mg per tablet   2.  Hypercholesteremia E78.00 272.0 3. Transient cerebral ischemia, unspecified type G45.9 435.9 CT HEAD WO CONT   4. Medication management Z79.899 V58.69        BP not controlled on 2 meds  She had clear TIA sxs w/o residual. Needs CR for further evaluation    after discussion will change her Exforge to the full  dose    Pt has edema, but she is not taking her BP med. Her pulse is still slow--even off beta blocker. She did not tolerate hydralazine.  Clonidine in the office helps    Pt declines all vaccines    F/u 5-6 weeks

## 2017-09-28 NOTE — PROGRESS NOTES
Chief Complaint   Patient presents with    Hypertension    Diabetes    Cholesterol Problem       Pt preferred language for health care discussion is English. Is someone accompanying this pt? no    Is the patient using any DME equipment during [address]? cane    Depression Screening:  PHQ over the last two weeks 9/28/2017 8/3/2017 6/22/2017 5/11/2017 4/11/2017 9/13/2016 8/1/2016   Little interest or pleasure in doing things Not at all Not at all Not at all Not at all Not at all Not at all Not at all   Feeling down, depressed or hopeless Not at all Not at all Not at all Not at all Not at all Not at all Not at all   Total Score PHQ 2 0 0 0 0 0 0 0       Learning Assessment:  Learning Assessment 8/1/2016   PRIMARY LEARNER Patient   HIGHEST LEVEL OF EDUCATION - PRIMARY LEARNER  GRADUATED HIGH SCHOOL OR GED   PRIMARY LANGUAGE ENGLISH   LEARNER PREFERENCE PRIMARY DEMONSTRATION   ANSWERED BY MARGIE Robbins   RELATIONSHIP SELF       Abuse Screening:  Abuse Screening Questionnaire 9/28/2017 8/1/2016   Do you ever feel afraid of your partner? N N   Are you in a relationship with someone who physically or mentally threatens you? N N   Is it safe for you to go home? Y Y       Fall Risk  Fall Risk Assessment, last 12 mths 9/28/2017 8/3/2017 6/22/2017 5/11/2017 4/11/2017 9/13/2016 8/1/2016   Able to walk? Yes Yes Yes Yes Yes Yes Yes   Fall in past 12 months? No Yes No No No No Yes   Fall with injury? - Yes - - - - No   Number of falls in past 12 months - 1 - - - - 3   Fall Risk Score - 2 - - - - 3         Health Maintenance reviewed and discussed per provider. Yes    Pt is due for MWV, Pneumo-13 or Peumo-23, Flu. Please order/place referral if appropriate. Pt currently taking Antiplatelet therapy? ASA      Advance Directive:  1. Do you have an advance directive in place? Patient Reply:no    2. If not, would you like material regarding how to put one in place? Patient Reply: no    Coordination of Care:  1.  Have you been to the ER, urgent care clinic since your last visit? Hospitalized since your last visit? no    2. Have you seen or consulted any other health care providers outside of the [address] [place] [address] since your last visit? Include any pap smears or colon screening.  no

## 2017-10-09 ENCOUNTER — HOSPITAL ENCOUNTER (OUTPATIENT)
Dept: CT IMAGING | Age: 82
Discharge: HOME | End: 2017-10-09
Attending: INTERNAL MEDICINE
Payer: MEDICARE

## 2017-10-09 DIAGNOSIS — G45.9 TRANSIENT CEREBRAL ISCHEMIA, UNSPECIFIED TYPE: ICD-10-CM

## 2017-10-09 PROCEDURE — 70450 CT HEAD/BRAIN W/O DYE: CPT

## 2017-10-10 NOTE — PROGRESS NOTES
Please advise pt/daughter that her CT did show 2 areas of small chang that could have caused her left sided sxs. They could not tell how old they were but probably are at least 3weeks old but more likely this fits with the sxs she had in July. These types of strokes are more common in people with high blood pressure so it is important to keep her BP controlled.

## 2017-10-11 NOTE — PROGRESS NOTES
Called pt, both name and  verified. Pt was advised of results. Pt verbalized understanding and had no further questions.

## 2017-11-28 ENCOUNTER — OFFICE VISIT (OUTPATIENT)
Dept: INTERNAL MEDICINE CLINIC | Age: 82
End: 2017-11-28

## 2017-11-28 VITALS
DIASTOLIC BLOOD PRESSURE: 75 MMHG | RESPIRATION RATE: 16 BRPM | WEIGHT: 183 LBS | HEIGHT: 59 IN | HEART RATE: 59 BPM | TEMPERATURE: 97.9 F | OXYGEN SATURATION: 93 % | BODY MASS INDEX: 36.89 KG/M2 | SYSTOLIC BLOOD PRESSURE: 166 MMHG

## 2017-11-28 DIAGNOSIS — E78.00 HYPERCHOLESTEREMIA: Chronic | ICD-10-CM

## 2017-11-28 DIAGNOSIS — I63.81 MULTIPLE LACUNAR INFARCTS (HCC): ICD-10-CM

## 2017-11-28 DIAGNOSIS — I10 ESSENTIAL HYPERTENSION: Chronic | ICD-10-CM

## 2017-11-28 DIAGNOSIS — Z00.00 MEDICARE ANNUAL WELLNESS VISIT, SUBSEQUENT: Primary | ICD-10-CM

## 2017-11-28 RX ORDER — AMLODIPINE AND VALSARTAN 5; 320 MG/1; MG/1
1 TABLET ORAL DAILY
Qty: 30 TAB | Refills: 5 | Status: SHIPPED | OUTPATIENT
Start: 2017-11-28 | End: 2018-01-26 | Stop reason: SDUPTHER

## 2017-11-28 RX ORDER — HYDROCHLOROTHIAZIDE 12.5 MG/1
12.5 TABLET ORAL DAILY
Qty: 90 TAB | Refills: 5 | Status: SHIPPED | OUTPATIENT
Start: 2017-11-28 | End: 2019-04-29

## 2017-11-28 NOTE — PROGRESS NOTES
ROOM # 3    Ilana Dunham presents today for   Chief Complaint   Patient presents with    Annual Wellness Visit       Ilana Dunham preferred language for health care discussion is english/other. Is someone accompanying this pt? no    Is the patient using any DME equipment during [address]? Yes, cane    Depression Screening:  PHQ over the last two weeks 11/28/2017 9/28/2017 8/3/2017 6/22/2017 5/11/2017 4/11/2017 9/13/2016   Little interest or pleasure in doing things Not at all Not at all Not at all Not at all Not at all Not at all Not at all   Feeling down, depressed or hopeless Not at all Not at all Not at all Not at all Not at all Not at all Not at all   Total Score PHQ 2 0 0 0 0 0 0 0       Learning Assessment:  Learning Assessment 8/1/2016   PRIMARY LEARNER Patient   HIGHEST LEVEL OF EDUCATION - PRIMARY LEARNER  GRADUATED HIGH SCHOOL OR GED   PRIMARY LANGUAGE ENGLISH   LEARNER PREFERENCE PRIMARY DEMONSTRATION   ANSWERED BY MARGIE Robbins   RELATIONSHIP SELF       Abuse Screening:  Abuse Screening Questionnaire 11/28/2017 9/28/2017 8/1/2016   Do you ever feel afraid of your partner? N N N   Are you in a relationship with someone who physically or mentally threatens you? N N N   Is it safe for you to go home? [facility]       Fall Risk  Fall Risk Assessment, last 12 mths 11/28/2017 9/28/2017 8/3/2017 6/22/2017 5/11/2017 4/11/2017 9/13/2016   Able to walk? Yes Yes Yes Yes Yes Yes Yes   Fall in past 12 months? Yes No Yes No No No No   Fall with injury? No - Yes - - - -   Number of falls in past 12 months 1 - 1 - - - -   Fall Risk Score 1 - 2 - - - -       Health Maintenance reviewed and discussed per provider. Yes    Ilana Dunham is due for mwv. Please order/place referral if appropriate. Advance Directive:  1. Do you have an advance directive in place? Patient Reply: no    2. If not, would you like material regarding how to put one in place? Patient Reply: no    Coordination of Care:  1.  Have you been to the ER, urgent care clinic since your last visit? Hospitalized since your last visit? no    2. Have you seen or consulted any other health care providers outside of the [address] [place] [address] since your last visit? Include any pap smears or colon screening.  no

## 2017-11-28 NOTE — ACP (ADVANCE CARE PLANNING)
Advance Care Planning (ACP) Provider Conversation Snapshot    Date of ACP Conversation: 11/28/17  Persons included in Conversation:  patient  Length of ACP Conversation in minutes:  <16 minutes (Non-Billable)    Authorized Decision Maker (if patient is incapable of making informed decisions): This person is: Other Legally Authorized Decision Maker (e.g. Next of Kin)          For Patients with Decision Making Capacity:   Values/Goals: Exploration of values, goals, and preferences if recovery is not expected, even with continued medical treatment in the event of:  Imminent death  Severe, permanent brain injury  \"In these circumstances, what matters most to you? \"  Care focused more on comfort or quality of life. wants no extraordinary measures if not a treatable illness which will return her to good health and function    Conversation Outcomes / Follow-Up Plan:   Care focused more on comfort or quality of life.   temporary use of machines to treat an illness for which she can return to good quality of life, such as to treat pneumonia

## 2017-11-28 NOTE — PROGRESS NOTES
This is a Subsequent Medicare Annual Wellness Exam (AWV) (Performed 12 months after IPPE or effective date of Medicare Part B enrollment)    I have reviewed the patient's medical history in detail and updated the computerized patient record. History     Past Medical History:   Diagnosis Date    Glaucoma     Hx of cataract     Hypercholesteremia     Hypertension 1990's      Past Surgical History:   Procedure Laterality Date    HX CATARACT REMOVAL Bilateral 2014    HX COLONOSCOPY  12/01/2010    Dr. Gareth Mas    HX HYSTERECTOMY       Current Outpatient Prescriptions   Medication Sig Dispense Refill    amLODIPine-valsartan (EXFORGE)  mg per tablet Take 1 Tab by mouth daily. 30 Tab 2    multivitamin (ONE A DAY) tablet Take  by Mouth Once a Day.  omega-3 fatty acids-vitamin e 1,000 mg cap 1,000 mg.  prednisoLONE acetate (PRED FORTE) 1 % ophthalmic suspension       aspirin delayed-release 81 mg tablet Take  by mouth. Pt takes 2-3 times a week      furosemide (LASIX) 20 mg tablet TAKE 1 TAB BY MOUTH DAILY. 5     Allergies   Allergen Reactions    Penicillins Other (comments)     Passed out      Statins-Hmg-Coa Reductase Inhibitors Myalgia     Tried multiple statins     Family History   Problem Relation Age of Onset    Stroke Mother 61    Other Father      fire at job    Cancer Brother     Other Brother      blood clots     Social History   Substance Use Topics    Smoking status: Former Smoker     Years: 5.00     Types: Cigarettes    Smokeless tobacco: Never Used      Comment: a pack lasted several months    Alcohol use No     Patient Active Problem List   Diagnosis Code    Hypercholesteremia E78.00    Essential hypertension I10       Depression Risk Factor Screening:     PHQ over the last two weeks 11/28/2017   Little interest or pleasure in doing things Not at all   Feeling down, depressed or hopeless Not at all   Total Score PHQ 2 0     Alcohol Risk Factor Screening:    You do not drink alcohol or very rarely. Functional Ability and Level of Safety:   Hearing Loss  Hearing is good. Activities of Daily Living. Has checked for safety issues. Does have scatter rugs  The home contains: no safety equipment. Patient needs help with:  transportation and walking. Does not drive. Uses a cane    Fall Risk  Fall Risk Assessment, last 12 mths 11/28/2017   Able to walk? Yes   Fall in past 12 months? Yes   Fall with injury? No   Number of falls in past 12 months 1   Fall Risk Score 1       Abuse Screen  Patient is not abused    Cognitive Screening   Evaluation of Cognitive Function:  Has your family/caregiver stated any concerns about your memory: yes  Normal. But had difficulty remembering consistently with repetition. Patient Care Team   Patient Care Team:  Magdaleno Jules MD as PCP - General (Internal Medicine)  Lyubov Hardy MD as Consulting Provider (Ophthalmology)  Norm Jurado MD as Consulting Provider (Gastroenterology)    Assessment/Plan   Education and counseling provided:  Are appropriate based on today's review and evaluation    Diagnoses and all orders for this visit:    1.  Medicare annual wellness visit, subsequent        Health Maintenance Due   Topic Date Due    MEDICARE YEARLY EXAM  09/14/2017       Slightly more forgetful than she used to be but still takes care of self  Uses a cane to walk due to bad knees    Otherwise up-to-date on her prevention

## 2017-11-28 NOTE — PATIENT INSTRUCTIONS

## 2017-11-28 NOTE — PROGRESS NOTES
HISTORY OF PRESENT ILLNESS  Tunde Matthews is a 80 y.o. female. Visit Vitals    /75 (BP 1 Location: Left arm, BP Patient Position: Sitting)    Pulse (!) 59    Temp 97.9 °F (36.6 °C) (Oral)    Resp 16    Ht 4' 11\" (1.499 m)    Wt 183 lb (83 kg)    SpO2 93%    BMI 36.96 kg/m2       HPI Comments: Pt states new medication is making her too dizzy so she does not take it consistently. She took it last night   We had increased her exforge 5-320 to   She does not take her lasix as she says it turns her hands blue. Hypertension    The history is provided by the patient (see comments). This is a chronic problem. The current episode started more than 1 week ago. The problem has not changed since onset. Associated symptoms include dizziness. Pertinent negatives include no chest pain, no palpitations and no shortness of breath. Review of Systems   Constitutional: Negative for chills and fever. Respiratory: Negative for shortness of breath. Cardiovascular: Positive for leg swelling. Negative for chest pain and palpitations. Neurological: Positive for dizziness. Physical Exam   Constitutional: She is oriented to person, place, and time. She appears well-developed and well-nourished. No distress. Cardiovascular: Normal rate and regular rhythm. Pulmonary/Chest: Effort normal and breath sounds normal.   Musculoskeletal: She exhibits no edema. Neurological: She is alert and oriented to person, place, and time. Skin: Skin is warm and dry. She is not diaphoretic. Psychiatric: She has a normal mood and affect. Nursing note and vitals reviewed. ASSESSMENT and PLAN    ICD-10-CM ICD-9-CM    1. Medicare annual wellness visit, subsequent Z00.00 V70.0    2. Essential hypertension I10 401.9 amLODIPine-valsartan (EXFORGE) 5-320 mg per tablet   3. Hypercholesteremia E78.00 272.0    4. Multiple lacunar infarcts (HCC) I63.9 434.91        BP still up.   Will change her exforge back to the lower dose  Discussed with pt the lacunar infarcts and risk for more severe stroke.   Will add 12.5 mg HCTZ to her routine    F/u 6 weeks

## 2017-11-28 NOTE — MR AVS SNAPSHOT
Visit Information Date & Time Provider Department Dept. Phone Encounter #  
 11/28/2017 10:30 AM Rafael Meza [id number] Follow-up Instructions Return in about 2 months (around 1/28/2018) for htn, cholesterol. Upcoming Health Maintenance Date Due  
 MEDICARE YEARLY EXAM 9/14/2017 ZOSTER VACCINE AGE 60> 4/29/2020* DTaP/Tdap/Td series (1 - Tdap) 4/29/2020* GLAUCOMA SCREENING Q2Y 3/28/2019 *Topic was postponed. The date shown is not the original due date. Allergies as of 11/28/2017  Review Complete On: 11/28/2017 By: Tanmay Horton LPN Severity Noted Reaction Type Reactions Penicillins  08/01/2016    Other (comments) Passed out Statins-hmg-coa Reductase Inhibitors  08/01/2016    Myalgia Tried multiple statins Current Immunizations  Reviewed on 11/22/2017 No immunizations on file. Not reviewed this visit You Were Diagnosed With   
  
 Codes Comments Medicare annual wellness visit, subsequent    -  Primary ICD-10-CM: Z00.00 ICD-9-CM: V70.0 Essential hypertension     ICD-10-CM: I10 
ICD-9-CM: 401.9 Hypercholesteremia     ICD-10-CM: E78.00 ICD-9-CM: 272.0 Multiple lacunar infarcts [facility])     ICD-10-CM: I63.9 ICD-9-CM: 434.91 Vitals BP Pulse Temp Resp Height(growth percentile) Weight(growth percentile) 166/75 (BP 1 Location: Left arm, BP Patient Position: Sitting) (!) 59 97.9 °F (36.6 °C) (Oral) 16 4' 11\" (1.499 m) 183 lb (83 kg) SpO2 BMI OB Status Smoking Status 93% 36.96 kg/m2 Hysterectomy Former Smoker Vitals History BMI and BSA Data Body Mass Index Body Surface Area  
 36.96 kg/m 2 1.86 m 2 Preferred Pharmacy Pharmacy Name Phone [id number] [address] [phone] Your Updated Medication List  
  
   
 This list is accurate as of: 11/28/17 11:36 AM.  Always use your most recent med list. amLODIPine-valsartan 5-320 mg per tablet Commonly known as:  Richa Lipschutz Take 1 Tab by mouth daily. aspirin delayed-release 81 mg tablet Take  by mouth. Pt takes 2-3 times a week  
  
 hydroCHLOROthiazide 12.5 mg tablet Commonly known as:  HYDRODIURIL Take 1 Tab by mouth daily. Indications: Edema, hypertension  
  
 multivitamin tablet Commonly known as:  ONE A DAY Take  by Mouth Once a Day. omega-3 fatty acids-vitamin e 1,000 mg Cap  
1,000 mg.  
  
 prednisoLONE acetate 1 % ophthalmic suspension Commonly known as:  PRED FORTE Prescriptions Sent to Pharmacy Refills  
 amLODIPine-valsartan (EXFORGE) 5-320 mg per tablet 5 Sig: Take 1 Tab by mouth daily. Class: Normal  
 Pharmacy: [address] [place] [address], [address] Ph #: [phone] Route: Oral  
 hydroCHLOROthiazide (HYDRODIURIL) 12.5 mg tablet 5 Sig: Take 1 Tab by mouth daily. Indications: Edema, hypertension Class: Normal  
 Pharmacy: [address] [place] [address] Ph #: [phone] Route: Oral  
  
Follow-up Instructions Return in about 2 months (around 1/28/2018) for htn, cholesterol. Patient Instructions Medicare Wellness Visit, Female The best way to live healthy is to have a healthy lifestyle by eating a well-balanced diet, exercising regularly, limiting alcohol and stopping smoking. Regular physical exams and screening tests are another way to keep healthy. Preventive exams provided by your health care provider can find health problems before they become diseases or illnesses. Preventive services including immunizations, screening tests, monitoring and exams can help you take care of your own health.  
 
All people over age 72 should have a pneumovax  and and a prevnar shot to prevent pneumonia. These are once in a lifetime unless you and your provider decide differently. All people over 65 should have a yearly flu shot and a tetanus vaccine every 10 years. A bone mass density to screen for osteoporosis or thinning of the bones should be done every 2 years after 65. Screening for diabetes mellitus with a blood sugar test should be done every year. Glaucoma is a disease of the eye due to increased ocular pressure that can lead to blindness and it should be done every year by an eye professional. 
 
Cardiovascular screening tests that check for elevated lipids (fatty part of blood) which can lead to heart disease and strokes should be done every 5 years. Colorectal screening that evaluates for blood or polyps in your colon should be done yearly as a stool test or every five years as a flexible sigmoidoscope or every 10 years as a colonoscopy up to age 76. Breast cancer screening with a mammogram is recommended biennially  for women age 54-69. Screening for cervical cancer with a pap smear and pelvic exam is recommended for women after age 72 years every 2 years up to age 79 or when the provider and patient decide to stop. If there is a history of cervical abnormalities or other increased risk for cancer then the test is recommended yearly. Hepatitis C screening is also recommended for anyone born between 80 through [address]. A shingles vaccine is also recommended once in a lifetime after age 61. Your Medicare Wellness Exam is recommended annually. Here is a list of your current Health Maintenance items with a due date: 
Health Maintenance Due Topic Date Due  
 Annual Well Visit  09/14/2017 Introducing [facility]! [place] introduces [employer] patient portal. Now you can access parts of your medical record, email your doctor's office, and request medication refills online.    
 
1. In your internet browser, go to [email or web address] 2. Click on the First Time User? Click Here link in the Sign In box. You will see the New Member Sign Up page. 3. Enter your [facility] Access Code exactly as it appears below. You will not need to use this code after youve completed the sign-up process. If you do not sign up before the expiration date, you must request a new code. · [facility] Access Code: [email or web address] Expires: 2/26/2018 11:36 AM 
 
4. Enter the last four digits of your Social Security Number (xxxx) and Date of Birth (mm/dd/yyyy) as indicated and click Submit. You will be taken to the next sign-up page. 5. Create a [employer] ID. This will be your [facility] login ID and cannot be changed, so think of one that is secure and easy to remember. 6. Create a [facility] password. You can change your password at any time. 7. Enter your Password Reset Question and Answer. This can be used at a later time if you forget your password. 8. Enter your e-mail address. You will receive e-mail notification when new information is available in [address] 9. Click Sign Up. You can now view and download portions of your medical record. 10. Click the Download Summary menu link to download a portable copy of your medical information. If you have questions, please visit the Frequently Asked Questions section of the [facility] website. Remember, [facility] is NOT to be used for urgent needs. For medical emergencies, dial 911. Now available from your iPhone and Android! Please provide this summary of care documentation to your next provider. Your primary care clinician is listed as [facility]. If you have any questions after today's visit, please call [phone].

## 2018-01-26 DIAGNOSIS — I10 ESSENTIAL HYPERTENSION: Chronic | ICD-10-CM

## 2018-01-26 RX ORDER — AMLODIPINE AND VALSARTAN 5; 320 MG/1; MG/1
1 TABLET ORAL DAILY
Qty: 30 TAB | Refills: 5 | Status: SHIPPED | OUTPATIENT
Start: 2018-01-26 | End: 2018-05-29 | Stop reason: SDUPTHER

## 2018-01-26 NOTE — TELEPHONE ENCOUNTER
Requested Prescriptions     Pending Prescriptions Disp Refills    amLODIPine-valsartan (EXFORGE) 5-320 mg per tablet 30 Tab 5     Sig: Take 1 Tab by mouth daily.        *Pharmacy requesting 90 (NINETY) day supply*

## 2018-01-29 ENCOUNTER — OFFICE VISIT (OUTPATIENT)
Dept: INTERNAL MEDICINE CLINIC | Age: 83
End: 2018-01-29

## 2018-01-29 VITALS
TEMPERATURE: 96 F | OXYGEN SATURATION: 95 % | SYSTOLIC BLOOD PRESSURE: 142 MMHG | WEIGHT: 175 LBS | HEART RATE: 71 BPM | HEIGHT: 59 IN | RESPIRATION RATE: 18 BRPM | DIASTOLIC BLOOD PRESSURE: 65 MMHG | BODY MASS INDEX: 35.28 KG/M2

## 2018-01-29 DIAGNOSIS — I10 ESSENTIAL HYPERTENSION: Primary | Chronic | ICD-10-CM

## 2018-01-29 DIAGNOSIS — E78.00 HYPERCHOLESTEREMIA: Chronic | ICD-10-CM

## 2018-01-29 NOTE — MR AVS SNAPSHOT
[facility] 
 
 
 [address] [facility] [address] [address] 
[phone] Patient: Jay Gagnon MRN: [id number] [place] Visit Information Date & Time Provider Department Dept. Phone Encounter #  
 1/29/2018 12:00 PM Priya Samuel, [address] [id number] Follow-up Instructions Return in about 4 months (around 5/29/2018) for htn, cholesterol. Upcoming Health Maintenance Date Due ZOSTER VACCINE AGE 60> 4/29/2020* DTaP/Tdap/Td series (1 - Tdap) 4/29/2020* MEDICARE YEARLY EXAM 11/29/2018 GLAUCOMA SCREENING Q2Y 3/28/2019 *Topic was postponed. The date shown is not the original due date. Allergies as of 1/29/2018  Review Complete On: 1/29/2018 By: Priya Samuel MD  
  
 Severity Noted Reaction Type Reactions Penicillins  08/01/2016    Other (comments) Passed out Statins-hmg-coa Reductase Inhibitors  08/01/2016    Myalgia Tried multiple statins Current Immunizations  Reviewed on 11/22/2017 No immunizations on file. Not reviewed this visit You Were Diagnosed With   
  
 Codes Comments Essential hypertension    -  Primary ICD-10-CM: I10 
ICD-9-CM: 401.9 Hypercholesteremia     ICD-10-CM: E78.00 ICD-9-CM: 272.0 Vitals BP Pulse Temp Resp Height(growth percentile) Weight(growth percentile) 142/65 (BP 1 Location: Right arm, BP Patient Position: Sitting) 71 96 °F (35.6 °C) (Oral) 18 4' 11\" (1.499 m) 175 lb (79.4 kg) SpO2 BMI OB Status Smoking Status 95% 35.35 kg/m2 Hysterectomy Former Smoker Vitals History BMI and BSA Data Body Mass Index Body Surface Area  
 35.35 kg/m 2 1.82 m 2 Preferred Pharmacy [facility] Name Phone [id number] [address] [facility] [address] [phone] Your Updated Medication List  
  
   
 This list is accurate as of: 1/29/18 12:23 PM.  Always use your most recent med list. amLODIPine-valsartan 5-320 mg per tablet Commonly known as:  Karrie Avis Take 1 Tab by mouth daily. aspirin delayed-release 81 mg tablet Take  by mouth. Pt takes 2-3 times a week  
  
 hydroCHLOROthiazide 12.5 mg tablet Commonly known as:  HYDRODIURIL Take 1 Tab by mouth daily. Indications: Edema, hypertension  
  
 multivitamin tablet Commonly known as:  ONE A DAY Take  by Mouth Once a Day. omega-3 fatty acids-vitamin e 1,000 mg Cap  
1,000 mg.  
  
 prednisoLONE acetate 1 % ophthalmic suspension Commonly known as:  PRED FORTE Follow-up Instructions Return in about 4 months (around 5/29/2018) for htn, cholesterol. To-Do List   
 01/29/2018 Lab:  LIPID PANEL   
  
 01/29/2018 Lab:  METABOLIC PANEL, COMPREHENSIVE Introducing [facility] [place] [facility]! [place] introduces [employer] patient portal. Now you can access parts of your medical record, email your doctor's office, and request medication refills online. 1. In your internet browser, go to [email or web address] 2. Click on the First Time User? Click Here link in the Sign In box. You will see the New Member Sign Up page. 3. Enter your [employer] Access Code exactly as it appears below. You will not need to use this code after youve completed the sign-up process. If you do not sign up before the expiration date, you must request a new code. · [employer] Access Code: [email or web address] Expires: 2/26/2018 11:36 AM 
 
4. Enter the last four digits of your Social Security Number (xxxx) and Date of Birth (mm/dd/yyyy) as indicated and click Submit. You will be taken to the next sign-up page. 5. Create a [employer] ID. This will be your [employer] login ID and cannot be changed, so think of one that is secure and easy to remember. 6. Create a GetApp password. You can change your password at any time. 7. Enter your Password Reset Question and Answer. This can be used at a later time if you forget your password. 8. Enter your e-mail address. You will receive e-mail notification when new information is available in [address] 9. Click Sign Up. You can now view and download portions of your medical record. 10. Click the Download Summary menu link to download a portable copy of your medical information. If you have questions, please visit the Frequently Asked Questions section of the [employer] website. Remember, [employer] is NOT to be used for urgent needs. For medical emergencies, dial 911. Now available from your iPhone and Android! Please provide this summary of care documentation to your next provider. Your primary care clinician is listed as [facility]. If you have any questions after today's visit, please call [phone].

## 2018-01-29 NOTE — PROGRESS NOTES
HISTORY OF PRESENT ILLNESS  Kitty Coombs is a 80 y.o. female. Visit Vitals    /65 (BP 1 Location: Right arm, BP Patient Position: Sitting)    Pulse 71    Temp 96 °F (35.6 °C) (Oral)    Resp 18    Ht 4' 11\" (1.499 m)    Wt 175 lb (79.4 kg)    SpO2 95%    BMI 35.35 kg/m2       HPI Comments: She has lost weight--states she was moving around a bit taking care of her sick daughter and not thinking about eating. She states she has not has the dizziness since last visit    Cholesterol Problem   The history is provided by the patient. This is a chronic problem. The problem occurs daily. The problem has not changed since onset. Pertinent negatives include no chest pain, no headaches and no shortness of breath. Exacerbated by: diet. The symptoms are relieved by medications (diet). Hypertension    The history is provided by the patient. This is a chronic problem. The current episode started more than 1 week ago. The problem has not changed since onset. Pertinent negatives include no chest pain, no palpitations, no headaches, no dizziness and no shortness of breath. There are no associated agents to hypertension. Risk factors include obesity and dyslipidemia. Review of Systems   Constitutional: Negative. Respiratory: Negative for shortness of breath. Cardiovascular: Negative for chest pain, palpitations and leg swelling. Neurological: Negative for dizziness and headaches. Physical Exam   Constitutional: She is oriented to person, place, and time. She appears well-developed and well-nourished. No distress. Cardiovascular: Normal rate and regular rhythm. Pulmonary/Chest: Effort normal and breath sounds normal.   Musculoskeletal: She exhibits no edema. Neurological: She is alert and oriented to person, place, and time. Skin: Skin is warm and dry. She is not diaphoretic. Psychiatric: She has a normal mood and affect. Nursing note and vitals reviewed.       ASSESSMENT and PLAN ICD-10-CM ICD-9-CM    1. Essential hypertension A06 366.9 METABOLIC PANEL, COMPREHENSIVE   2. Hypercholesteremia E78.00 272.0 LIPID PANEL       Doing well overall.      Update lab    BP much better    F/u 4 months

## 2018-01-29 NOTE — PROGRESS NOTES
Chief Complaint   Patient presents with    Cholesterol Problem    Hypertension       Pt preferred language for health care discussion is english. Is someone accompanying this pt? no    Is the patient using any DME equipment during [address]? Yes, cane    Depression Screening:  PHQ over the last two weeks 11/28/2017 9/28/2017 8/3/2017 6/22/2017 5/11/2017 4/11/2017 9/13/2016   Little interest or pleasure in doing things Not at all Not at all Not at all Not at all Not at all Not at all Not at all   Feeling down, depressed or hopeless Not at all Not at all Not at all Not at all Not at all Not at all Not at all   Total Score PHQ 2 0 0 0 0 0 0 0       Learning Assessment:  Learning Assessment 8/1/2016   PRIMARY LEARNER Patient   HIGHEST LEVEL OF EDUCATION - PRIMARY LEARNER  GRADUATED HIGH SCHOOL OR GED   PRIMARY LANGUAGE ENGLISH   LEARNER PREFERENCE PRIMARY DEMONSTRATION   ANSWERED BY MARGIE Robbins   RELATIONSHIP SELF       Abuse Screening:  Abuse Screening Questionnaire 11/28/2017 9/28/2017 8/1/2016   Do you ever feel afraid of your partner? N N N   Are you in a relationship with someone who physically or mentally threatens you? N N N   Is it safe for you to go home? Chioma Francois       Fall Risk  Fall Risk Assessment, last 12 mths 1/29/2018 11/28/2017 9/28/2017 8/3/2017 6/22/2017 5/11/2017 4/11/2017   Able to walk? Yes Yes Yes Yes Yes Yes Yes   Fall in past 12 months? No Yes No Yes No No No   Fall with injury? - No - Yes - - -   Number of falls in past 12 months - 1 - 1 - - -   Fall Risk Score - 1 - 2 - - -         Health Maintenance reviewed and discussed per provider. Yes    Malka Alexander is updated on all     Pt currently taking Antiplatelet therapy? Yes, aspirin 81    Advance Directive:  1. Do you have an advance directive in place? Patient Reply:no    2. If not, would you like material regarding how to put one in place? Patient Reply: no      Coordination of Care:  1.  Have you been to the ER, urgent care clinic since your last visit? Hospitalized since your last visit? no    2. Have you seen or consulted any other health care providers outside of the [address] [place] [address] since your last visit? Include any pap smears or colon screening.  no

## 2018-02-19 DIAGNOSIS — I10 ESSENTIAL HYPERTENSION: Chronic | ICD-10-CM

## 2018-02-19 DIAGNOSIS — E78.00 HYPERCHOLESTEREMIA: Chronic | ICD-10-CM

## 2018-02-21 ENCOUNTER — DOCUMENTATION ONLY (OUTPATIENT)
Dept: INTERNAL MEDICINE CLINIC | Age: 83
End: 2018-02-21

## 2018-05-29 ENCOUNTER — OFFICE VISIT (OUTPATIENT)
Dept: INTERNAL MEDICINE CLINIC | Age: 83
End: 2018-05-29

## 2018-05-29 VITALS
BODY MASS INDEX: 35.88 KG/M2 | RESPIRATION RATE: 18 BRPM | TEMPERATURE: 98 F | DIASTOLIC BLOOD PRESSURE: 77 MMHG | SYSTOLIC BLOOD PRESSURE: 139 MMHG | OXYGEN SATURATION: 97 % | WEIGHT: 178 LBS | HEIGHT: 59 IN | HEART RATE: 54 BPM

## 2018-05-29 DIAGNOSIS — E78.00 HYPERCHOLESTEREMIA: Chronic | ICD-10-CM

## 2018-05-29 DIAGNOSIS — I10 ESSENTIAL HYPERTENSION: Primary | Chronic | ICD-10-CM

## 2018-05-29 PROBLEM — E66.01 SEVERE OBESITY (BMI 35.0-39.9) WITH COMORBIDITY (HCC): Status: ACTIVE | Noted: 2018-05-29

## 2018-05-29 RX ORDER — AMLODIPINE AND VALSARTAN 5; 320 MG/1; MG/1
1 TABLET ORAL DAILY
Qty: 30 TAB | Refills: 5 | Status: SHIPPED | OUTPATIENT
Start: 2018-05-29 | End: 2018-10-05 | Stop reason: SDUPTHER

## 2018-05-29 NOTE — PROGRESS NOTES
HISTORY OF PRESENT ILLNESS  Olamide Marks is a 80 y.o. female. Visit Vitals    /77 (BP 1 Location: Right arm, BP Patient Position: Sitting)    Pulse (!) 54    Temp 98 °F (36.7 °C) (Oral)    Resp 18    Ht 4' 11\" (1.499 m)    Wt 178 lb (80.7 kg)    SpO2 97%    BMI 35.95 kg/m2       Hypertension    The history is provided by the patient. This is a chronic problem. The current episode started more than 1 week ago. The problem has not changed since onset. Pertinent negatives include no chest pain, no palpitations, no headaches, no dizziness and no shortness of breath. There are no associated agents to hypertension. Risk factors include obesity. Cholesterol Problem   The history is provided by the patient. This is a chronic problem. The current episode started more than 1 week ago. The problem occurs daily. The problem has not changed since onset. Pertinent negatives include no chest pain, no headaches and no shortness of breath. Exacerbated by: diet. The symptoms are relieved by medications (diet). Review of Systems   Constitutional: Negative. Respiratory: Negative for cough and shortness of breath. Cardiovascular: Negative for chest pain and palpitations. Neurological: Negative for dizziness and headaches. Endo/Heme/Allergies: Negative for polydipsia. Physical Exam   Constitutional: She is oriented to person, place, and time. She appears well-developed and well-nourished. No distress. Cardiovascular: Normal rate and regular rhythm. Pulmonary/Chest: Effort normal and breath sounds normal.   Musculoskeletal: She exhibits no edema. Neurological: She is alert and oriented to person, place, and time. Skin: Skin is warm and dry. She is not diaphoretic. Psychiatric: She has a normal mood and affect. Nursing note and vitals reviewed. ASSESSMENT and PLAN    ICD-10-CM ICD-9-CM    1.  Essential hypertension I10 401.9 amLODIPine-valsartan (EXFORGE) 5-320 mg per tablet METABOLIC PANEL, BASIC   2. Hypercholesteremia E78.00 272.0 LIPID PANEL       BP controlled. Update lab    Continue current meds    Discussed BMI/weight, lifestyle, diet and exercise. Discussed effect on blood pressure, blood sugar, and joints especially  Focus on limiting white carbs, portion control, and moving more.     F/u 6 months for MWV, HTN

## 2018-05-29 NOTE — PROGRESS NOTES
ROOM # 5  Pt presents today for 4 month follow up on htn, cholesterol. Pt reports BP still fluctuates. Ren Beebe presents today for   Chief Complaint   Patient presents with    Hypertension    Cholesterol Problem       Ren Beebe preferred language for health care discussion is english/other. Is someone accompanying this pt? no    Is the patient using any DME equipment during [address]? Yes, cane    Depression Screening:  PHQ over the last two weeks 5/29/2018 11/28/2017 9/28/2017 8/3/2017 6/22/2017 5/11/2017 4/11/2017   Little interest or pleasure in doing things Not at all Not at all Not at all Not at all Not at all Not at all Not at all   Feeling down, depressed or hopeless Not at all Not at all Not at all Not at all Not at all Not at all Not at all   Total Score PHQ 2 0 0 0 0 0 0 0       Learning Assessment:  Learning Assessment 8/1/2016   PRIMARY LEARNER Patient   HIGHEST LEVEL OF EDUCATION - PRIMARY LEARNER  GRADUATED HIGH SCHOOL OR GED   PRIMARY LANGUAGE ENGLISH   LEARNER PREFERENCE PRIMARY DEMONSTRATION   ANSWERED BY MARGIE Robbins   RELATIONSHIP SELF       Abuse Screening:  Abuse Screening Questionnaire 11/28/2017 9/28/2017 8/1/2016   Do you ever feel afraid of your partner? N N N   Are you in a relationship with someone who physically or mentally threatens you? N N N   Is it safe for you to go home? Royal Daily       Fall Risk  Fall Risk Assessment, last 12 mths 5/29/2018 1/29/2018 11/28/2017 9/28/2017 8/3/2017 6/22/2017 5/11/2017   Able to walk? Yes Yes Yes Yes Yes Yes Yes   Fall in past 12 months? No No Yes No Yes No No   Fall with injury? - - No - Yes - -   Number of falls in past 12 months - - 1 - 1 - -   Fall Risk Score - - 1 - 2 - -       Health Maintenance reviewed and discussed per provider. Yes    Ren Beebe is due for There are no preventive care reminders to display for this patient. Please order/place referral if appropriate. Advance Directive:  1.  Do you have an advance directive in place? Patient Reply: yes    2. If not, would you like material regarding how to put one in place? Patient Reply: no    Coordination of Care:  1. Have you been to the ER, urgent care clinic since your last visit? Hospitalized since your last visit? no    2. Have you seen or consulted any other health care providers outside of the [address] [place] [address] since your last visit? Include any pap smears or colon screening.  no

## 2018-05-29 NOTE — MR AVS SNAPSHOT
[facility] 
 
 
 [address] [facility] [address] [address] 
[phone] Patient: Keri Barger MRN: [id number] Laura Ann Visit Information Date & Time Provider Department Dept. Phone Encounter #  
 5/29/2018 12:00 PM Shannan Luna, [address] [id number] Follow-up Instructions Return in about 6 months (around 11/29/2018) for Medicare Wellness Visit, htn, cholesterol. Upcoming Health Maintenance Date Due ZOSTER VACCINE AGE 60> 4/29/2020* DTaP/Tdap/Td series (1 - Tdap) 4/29/2020* Influenza Age 5 to Adult 8/1/2018 MEDICARE YEARLY EXAM 11/29/2018 GLAUCOMA SCREENING Q2Y 3/28/2019 *Topic was postponed. The date shown is not the original due date. Allergies as of 5/29/2018  Review Complete On: 5/29/2018 By: Shannan Luna MD  
  
 Severity Noted Reaction Type Reactions Penicillins  08/01/2016    Other (comments) Passed out Statins-hmg-coa Reductase Inhibitors  08/01/2016    Myalgia Tried multiple statins Current Immunizations  Reviewed on 5/25/2018 No immunizations on file. Not reviewed this visit You Were Diagnosed With   
  
 Codes Comments Essential hypertension    -  Primary ICD-10-CM: I10 
ICD-9-CM: 401.9 Hypercholesteremia     ICD-10-CM: E78.00 ICD-9-CM: 272.0 Vitals BP Pulse Temp Resp Height(growth percentile) Weight(growth percentile) 139/77 (BP 1 Location: Right arm, BP Patient Position: Sitting) (!) 54 98 °F (36.7 °C) (Oral) 18 4' 11\" (1.499 m) 178 lb (80.7 kg) SpO2 BMI OB Status Smoking Status 97% 35.95 kg/m2 Hysterectomy Former Smoker Vitals History BMI and BSA Data Body Mass Index Body Surface Area 35.95 kg/m 2 1.83 m 2 Preferred Pharmacy Pharmacy Name Phone [id number], [address] [place] [address] [phone] Your Updated Medication List  
  
 This list is accurate as of 5/29/18  1:01 PM.  Always use your most recent med list. amLODIPine-valsartan 5-320 mg per tablet Commonly known as:  Delsie Nian Take 1 Tab by mouth daily. aspirin delayed-release 81 mg tablet Take  by mouth. Pt takes 2-3 times a week  
  
 hydroCHLOROthiazide 12.5 mg tablet Commonly known as:  HYDRODIURIL Take 1 Tab by mouth daily. Indications: Edema, hypertension  
  
 multivitamin tablet Commonly known as:  ONE A DAY Take  by Mouth Once a Day. omega-3 fatty acids-vitamin e 1,000 mg Cap  
1,000 mg.  
  
 prednisoLONE acetate 1 % ophthalmic suspension Commonly known as:  PRED FORTE Prescriptions Sent to Pharmacy Refills  
 amLODIPine-valsartan (EXFORGE) 5-320 mg per tablet 5 Sig: Take 1 Tab by mouth daily. Class: Normal  
 Pharmacy: [address] [place] [address] 29  #: [phone] Route: Oral  
  
Follow-up Instructions Return in about 6 months (around 11/29/2018) for Medicare Wellness Visit, htn, cholesterol. To-Do List   
 05/29/2018 Lab:  LIPID PANEL   
  
 05/29/2018 Lab:  METABOLIC PANEL, BASIC Introducing [facility]! [facility] introduces Datameer patient portal. Now you can access parts of your medical record, email your doctor's office, and request medication refills online. 1. In your internet browser, go to [email or web address] 2. Click on the First Time User? Click Here link in the Sign In box. You will see the New Member Sign Up page. 3. Enter your Datameer Access Code exactly as it appears below. You will not need to use this code after youve completed the sign-up process. If you do not sign up before the expiration date, you must request a new code. · Datameer Access Code: [email or web address] Expires: 8/27/2018  1:01 PM 
 
4.  Enter the last four digits of your Social Security Number (xxxx) and Date of Birth (mm/dd/yyyy) as indicated and click Submit. You will be taken to the next sign-up page. 5. Create a 1-800-DOCTORS ID. This will be your 1-800-DOCTORS login ID and cannot be changed, so think of one that is secure and easy to remember. 6. Create a 1-800-DOCTORS password. You can change your password at any time. 7. Enter your Password Reset Question and Answer. This can be used at a later time if you forget your password. 8. Enter your e-mail address. You will receive e-mail notification when new information is available in [address] 9. Click Sign Up. You can now view and download portions of your medical record. 10. Click the Download Summary menu link to download a portable copy of your medical information. If you have questions, please visit the Frequently Asked Questions section of the 1-800-DOCTORS website. Remember, 1-800-DOCTORS is NOT to be used for urgent needs. For medical emergencies, dial 911. Now available from your iPhone and Android! Please provide this summary of care documentation to your next provider. Your primary care clinician is listed as [facility]. If you have any questions after today's visit, please call [phone].

## 2018-05-30 LAB
ALB/GLOBRATIO, 58C: 1.3 (CALC) (ref 1–2.5)
ALBUMIN SERPL-MCNC: 4.1 G/DL (ref 3.6–5.1)
ALP SERPL-CCNC: 71 U/L (ref 33–130)
ALT SERPL-CCNC: 11 U/L (ref 6–29)
AST SERPL W P-5'-P-CCNC: 14 U/L (ref 10–35)
BILIRUB SERPL-MCNC: 1.2 MG/DL (ref 0.2–1.2)
BUN SERPL-MCNC: 16 MG/DL (ref 7–25)
BUN/CREATININE RATIO,BUCR: NORMAL (CALC) (ref 6–22)
CALCIUM SERPL-MCNC: 9.2 MG/DL (ref 8.6–10.4)
CHLORIDE SERPL-SCNC: 103 MMOL/L (ref 98–110)
CHOL/HDL RATIO,CHHDX: 6.1 (CALC)
CHOLEST SERPL-MCNC: 292 MG/DL
CO2 SERPL-SCNC: 27 MMOL/L (ref 20–31)
CREAT SERPL-MCNC: 0.81 MG/DL (ref 0.6–0.88)
GLOBULIN,GLOB: 3.2 G/DL (CALC) (ref 1.9–3.7)
GLUCOSE SERPL-MCNC: 78 MG/DL (ref 65–99)
HDLC SERPL-MCNC: 48 MG/DL
LDL-CHOLESTEROL: 225 MG/DL (CALC)
NON-HDL CHOLESTEROL, 011976: 244 MG/DL (CALC)
POTASSIUM SERPL-SCNC: 4.2 MMOL/L (ref 3.5–5.3)
PROT SERPL-MCNC: 7.3 G/DL (ref 6.1–8.1)
SODIUM SERPL-SCNC: 140 MMOL/L (ref 135–146)
TRIGL SERPL-MCNC: 79 MG/DL (ref ?–150)

## 2018-07-02 DIAGNOSIS — E78.00 HYPERCHOLESTEREMIA: Chronic | ICD-10-CM

## 2018-07-02 DIAGNOSIS — I10 ESSENTIAL HYPERTENSION: Chronic | ICD-10-CM

## 2018-10-05 DIAGNOSIS — I10 ESSENTIAL HYPERTENSION: Chronic | ICD-10-CM

## 2018-10-05 RX ORDER — AMLODIPINE AND VALSARTAN 5; 320 MG/1; MG/1
1 TABLET ORAL DAILY
Qty: 30 TAB | Refills: 5 | Status: SHIPPED | OUTPATIENT
Start: 2018-10-05 | End: 2018-10-10 | Stop reason: SDUPTHER

## 2018-10-10 DIAGNOSIS — I10 ESSENTIAL HYPERTENSION: Chronic | ICD-10-CM

## 2018-10-10 RX ORDER — AMLODIPINE AND VALSARTAN 5; 320 MG/1; MG/1
1 TABLET ORAL DAILY
Qty: 30 TAB | Refills: 5 | Status: SHIPPED | OUTPATIENT
Start: 2018-10-10 | End: 2018-10-10 | Stop reason: SDUPTHER

## 2018-10-10 RX ORDER — AMLODIPINE AND VALSARTAN 5; 320 MG/1; MG/1
1 TABLET ORAL DAILY
Qty: 90 TAB | Refills: 1 | Status: SHIPPED | OUTPATIENT
Start: 2018-10-10 | End: 2019-11-26 | Stop reason: SDUPTHER

## 2018-10-10 NOTE — TELEPHONE ENCOUNTER
[facility] calling to get [facility] for the patient. Requested Prescriptions     Pending Prescriptions Disp Refills    amLODIPine-valsartan (EXFORGE) 5-320 mg per tablet 30 Tab 5     Sig: Take 1 Tab by mouth daily.

## 2018-10-10 NOTE — TELEPHONE ENCOUNTER
Requested Prescriptions     Pending Prescriptions Disp Refills    amLODIPine-valsartan (EXFORGE) 5-320 mg per tablet 30 Tab 5     Sig: Take 1 Tab by mouth daily.      [facility]

## 2019-01-28 ENCOUNTER — OFFICE VISIT (OUTPATIENT)
Dept: INTERNAL MEDICINE CLINIC | Age: 84
End: 2019-01-28

## 2019-01-28 VITALS
TEMPERATURE: 97.6 F | HEART RATE: 69 BPM | SYSTOLIC BLOOD PRESSURE: 162 MMHG | DIASTOLIC BLOOD PRESSURE: 79 MMHG | OXYGEN SATURATION: 98 % | RESPIRATION RATE: 12 BRPM | BODY MASS INDEX: 36.49 KG/M2 | HEIGHT: 59 IN | WEIGHT: 181 LBS

## 2019-01-28 DIAGNOSIS — I10 ESSENTIAL HYPERTENSION: ICD-10-CM

## 2019-01-28 DIAGNOSIS — E66.01 SEVERE OBESITY (BMI 35.0-39.9) WITH COMORBIDITY (HCC): ICD-10-CM

## 2019-01-28 DIAGNOSIS — Z00.00 MEDICARE ANNUAL WELLNESS VISIT, SUBSEQUENT: Primary | ICD-10-CM

## 2019-01-28 DIAGNOSIS — E78.00 HYPERCHOLESTEREMIA: ICD-10-CM

## 2019-01-28 RX ORDER — BIMATOPROST 0.3 MG/ML
SOLUTION/ DROPS OPHTHALMIC
COMMUNITY
Start: 2021-04-16 | End: 2022-04-19

## 2019-01-28 RX ORDER — METOPROLOL TARTRATE 50 MG/1
50 TABLET ORAL
COMMUNITY
End: 2019-04-29

## 2019-01-28 RX ORDER — LOTEPREDNOL ETABONATE 5 MG/ML
SUSPENSION/ DROPS OPHTHALMIC
COMMUNITY
Start: 2010-11-22 | End: 2021-10-01

## 2019-01-28 NOTE — PROGRESS NOTES
This is the Subsequent Medicare Annual Wellness Exam, performed 12 months or more after the Initial AWV or the last Subsequent AWV    I have reviewed the patient's medical history in detail and updated the computerized patient record. History     Past Medical History:   Diagnosis Date    Glaucoma     Hx of cataract     Hypercholesteremia     Hypertension 1990's      Past Surgical History:   Procedure Laterality Date    HX CATARACT REMOVAL Bilateral 2014    HX COLONOSCOPY  12/01/2010    Dr. Mahogany Sewell. Raegan    HX HYSTERECTOMY       Current Outpatient Medications   Medication Sig Dispense Refill    amLODIPine-valsartan (EXFORGE) 5-320 mg per tablet Take 1 Tab by mouth daily. 90 Tab 1    multivitamin (ONE A DAY) tablet Take  by Mouth Once a Day.  omega-3 fatty acids-vitamin e 1,000 mg cap 1,000 mg.  prednisoLONE acetate (PRED FORTE) 1 % ophthalmic suspension       aspirin delayed-release 81 mg tablet Take  by mouth. Pt takes 2-3 times a week      bimatoprost (LUMIGAN) 0.03 % ophthalmic drops Instill 1 Drop in affected eye(s) Every Night at Bedtime.  loteprednol etabonate (LOTEMAX) 0.5 % ophthalmic suspension Instill 1 Drop in affected eye(s) Once a Day.  metoprolol tartrate (LOPRESSOR) 50 mg tablet 50 mg.      fish oil-omega-3 fatty acids 340-1,000 mg capsule 1,000 mg.  hydroCHLOROthiazide (HYDRODIURIL) 12.5 mg tablet Take 1 Tab by mouth daily.  Indications: Edema, hypertension 90 Tab 5     Allergies   Allergen Reactions    Penicillins Other (comments)     Passed out      Statins-Hmg-Coa Reductase Inhibitors Myalgia     Tried multiple statins     Family History   Problem Relation Age of Onset    Stroke Mother 61    Other Father         fire at job    Cancer Brother     Other Brother         blood clots     Social History     Tobacco Use    Smoking status: Former Smoker     Years: 5.00     Types: Cigarettes    Smokeless tobacco: Never Used    Tobacco comment: a pack lasted several months   Substance Use Topics    Alcohol use: No     Alcohol/week: 0.0 oz     Patient Active Problem List   Diagnosis Code    Hypercholesteremia E78.00    Essential hypertension I10    Severe obesity (BMI 35.0-39. 9) with comorbidity ([facility] E66.01       Depression Risk Factor Screening:     PHQ over the last two weeks 1/28/2019   Little interest or pleasure in doing things Not at all   Feeling down, depressed, irritable, or hopeless Not at all   Total Score PHQ 2 0     Alcohol Risk Factor Screening: You do not drink alcohol or very rarely. Functional Ability and Level of Safety:   Hearing Loss  Hearing is good. Activities of Daily Living  The home contains: no safety equipment. Patient does total self care    Fall Risk  Fall Risk Assessment, last 12 mths 1/28/2019   Able to walk? Yes   Fall in past 12 months? Yes   Fall with injury? No   Number of falls in past 12 months 1   Fall Risk Score 1       Abuse Screen  Patient is not abused    Cognitive Screening   Evaluation of Cognitive Function:  Has your family/caregiver stated any concerns about your memory: no  Normal, Mini Cog test-- got 2/3, 3rd with cue    Patient Care Team   Patient Care Team:  Shanda Cintron MD as PCP - General (Internal Medicine)  Maya Daniels MD as Consulting Provider (Ophthalmology)  Manish Wilkinson MD as Consulting Provider (Gastroenterology)    Assessment/Plan   Education and counseling provided:  Are appropriate based on today's review and evaluation    Diagnoses and all orders for this visit:    1. Medicare annual wellness visit, subsequent    pt declines all vaccines.    No longer wishes other preventive service such as mammogram    Health Maintenance Due   Topic Date Due    Shingrix Vaccine Age 49> (1 of 2) 08/15/1982    MEDICARE YEARLY EXAM  11/29/2018    GLAUCOMA SCREENING Q2Y  03/28/2019

## 2019-01-28 NOTE — PATIENT INSTRUCTIONS
Medicare Wellness Visit, Female     The best way to live healthy is to have a lifestyle where you eat a well-balanced diet, exercise regularly, limit alcohol use, and quit all forms of tobacco/nicotine, if applicable. Regular preventive services are another way to keep healthy. Preventive services (vaccines, screening tests, monitoring & exams) can help personalize your care plan, which helps you manage your own care. Screening tests can find health problems at the earliest stages, when they are easiest to treat. [facility] follows the current, evidence-based guidelines published by the [facility] (RUSTSTF) when recommending preventive services for our patients. Because we follow these guidelines, sometimes recommendations change over time as research supports it. (For example, mammograms used to be recommended annually. Even though Medicare will still pay for an annual mammogram, the newer guidelines recommend a mammogram every two years for women of average risk.)  Of course, you and your doctor may decide to screen more often for some diseases, based on your risk and your health status. Preventive services for you include:  - Medicare offers their members a free annual wellness visit, which is time for you and your primary care provider to discuss and plan for your preventive service needs. Take advantage of this benefit every year!  -All adults over the age of 72 should receive the recommended pneumonia vaccines. Current USPSTF guidelines recommend a series of two vaccines for the best pneumonia protection.   -All adults should have a flu vaccine yearly and a tetanus vaccine every 10 years. All adults age 61 and older should receive a shingles vaccine once in their lifetime.    -A bone mass density test is recommended when a woman turns 65 to screen for osteoporosis. This test is only recommended one time, as a screening.  Some providers will use this same test as a disease monitoring tool if you already have osteoporosis. -All adults age 38-68 who are overweight should have a diabetes screening test once every three years.   -Other screening tests and preventive services for persons with diabetes include: an eye exam to screen for diabetic retinopathy, a kidney function test, a foot exam, and stricter control over your cholesterol.   -Cardiovascular screening for adults with routine risk involves an electrocardiogram (ECG) at intervals determined by your doctor.   -Colorectal cancer screenings should be done for adults age 54-65 with no increased risk factors for colorectal cancer. There are a number of acceptable methods of screening for this type of cancer. Each test has its own benefits and drawbacks. Discuss with your doctor what is most appropriate for you during your annual wellness visit. The different tests include: colonoscopy (considered the best screening method), a fecal occult blood test, a fecal DNA test, and sigmoidoscopy. -Breast cancer screenings are recommended every other year for women of normal risk, age 54-69.  -Cervical cancer screenings for women over age 72 are only recommended with certain risk factors.   -All adults born between [facility] should be screened once for Hepatitis C.      Here is a list of your current Health Maintenance items (your personalized list of preventive services) with a due date:  Health Maintenance Due   Topic Date Due    Shingles Vaccine (1 of 2) 08/15/1982    Annual Well Visit  11/29/2018    Glaucoma Screening   03/28/2019

## 2019-01-28 NOTE — PROGRESS NOTES
HISTORY OF PRESENT ILLNESS  Vitaly Santacruz is a 80 y.o. female. Visit Vitals  /79   Pulse 69   Temp 97.6 °F (36.4 °C) (Oral)   Resp 12   Ht 4' 11\" (1.499 m)   Wt 181 lb (82.1 kg)   SpO2 98%   BMI 36.56 kg/m²             Current Outpatient Medications:  amLODIPine-valsartan (EXFORGE) 5-320 mg per tablet, Take 1 Tab by mouth daily. multivitamin (ONE A DAY) tablet, Take  by Mouth Once a Day. omega-3 fatty acids-vitamin e 1,000 mg cap, 1,000 mg.  prednisoLONE acetate (PRED FORTE) 1 % ophthalmic suspension,   aspirin delayed-release 81 mg tablet, Take  by mouth. Pt takes 2-3 times a week  bimatoprost (LUMIGAN) 0.03 % ophthalmic drops, Instill 1 Drop in affected eye(s) Every Night at Bedtime. loteprednol etabonate (LOTEMAX) 0.5 % ophthalmic suspension, Instill 1 Drop in affected eye(s) Once a Day. metoprolol tartrate (LOPRESSOR) 50 mg tablet, 50 mg.  fish oil-omega-3 fatty acids 340-1,000 mg capsule, 1,000 mg.  hydroCHLOROthiazide (HYDRODIURIL) 12.5 mg tablet, Take 1 Tab by mouth daily. Indications: Edema, hypertension    No current facility-administered medications for this visit. Hypertension    The history is provided by the patient. This is a chronic problem. The current episode started more than 1 week ago. The problem has not changed since onset. Pertinent negatives include no chest pain, no palpitations, no headaches, no dizziness and no shortness of breath. There are no associated agents to hypertension. Review of Systems   Constitutional: Negative. Respiratory: Negative for shortness of breath. Cardiovascular: Negative for chest pain and palpitations. Neurological: Negative for dizziness and headaches. Physical Exam   Constitutional: She is oriented to person, place, and time. She appears well-developed and well-nourished. No distress. Cardiovascular: Normal rate and regular rhythm.    Pulmonary/Chest: Effort normal and breath sounds normal.   Musculoskeletal: She exhibits no edema. Neurological: She is alert and oriented to person, place, and time. Skin: Skin is warm and dry. She is not diaphoretic. Psychiatric: She has a normal mood and affect. Nursing note and vitals reviewed. ASSESSMENT and PLAN    ICD-10-CM ICD-9-CM           2. Essential hypertension I10 401.9    3. Hypercholesteremia E78.00 272.0        BP up today. Unclear why. She thinks it was something she ate yesterday    Recommend checking at home for a few weeks    No lab today--will check next visit    Continue diet and exercise.     F/u 3 months for BP check

## 2019-01-28 NOTE — PROGRESS NOTES
Luis Bonner is a 80 y.o. female (: 8/15/1932) presenting to address:    Chief Complaint   Patient presents with    Annual Wellness Visit       Vitals:    19 1210   BP: 162/79   Pulse: 69   Resp: 12   Temp: 97.6 °F (36.4 °C)   TempSrc: Oral   SpO2: 98%   Weight: 181 lb (82.1 kg)   Height: 4' 11\" (1.499 m)   PainSc:   0 - No pain       Hearing/Vision:   No exam data present    Learning Assessment:     Learning Assessment 2016   PRIMARY LEARNER Patient   HIGHEST LEVEL OF EDUCATION - PRIMARY LEARNER  GRADUATED HIGH SCHOOL OR GED   PRIMARY LANGUAGE ENGLISH   LEARNER PREFERENCE PRIMARY DEMONSTRATION   ANSWERED BY MARGIE Robbins   RELATIONSHIP SELF     Depression Screening:     PHQ over the last two weeks 2019   Little interest or pleasure in doing things Not at all   Feeling down, depressed, irritable, or hopeless Not at all   Total Score PHQ 2 0     Fall Risk Assessment:     Fall Risk Assessment, last 12 mths 2019   Able to walk? Yes   Fall in past 12 months? Yes   Fall with injury? No   Number of falls in past 12 months 1   Fall Risk Score 1     Abuse Screening:     Abuse Screening Questionnaire 2017   Do you ever feel afraid of your partner? N   Are you in a relationship with someone who physically or mentally threatens you? N   Is it safe for you to go home? Y     Coordination of Care Questionaire:   1. Have you been to the ER, urgent care clinic since your last visit? Hospitalized since your last visit? NO    2. Have you seen or consulted any other health care providers outside of the [address] [place] [address] since your last visit? Include any pap smears or colon screening. NO    Advanced Directive:   1. Do you have an Advanced Directive? YES    2. Would you like information on Advanced Directives?  NO    Pt declines flu vaccine

## 2019-04-29 ENCOUNTER — OFFICE VISIT (OUTPATIENT)
Dept: INTERNAL MEDICINE CLINIC | Age: 84
End: 2019-04-29

## 2019-04-29 VITALS
OXYGEN SATURATION: 98 % | RESPIRATION RATE: 19 BRPM | DIASTOLIC BLOOD PRESSURE: 64 MMHG | HEIGHT: 59 IN | HEART RATE: 60 BPM | TEMPERATURE: 96.2 F | WEIGHT: 178 LBS | BODY MASS INDEX: 35.88 KG/M2 | SYSTOLIC BLOOD PRESSURE: 179 MMHG

## 2019-04-29 DIAGNOSIS — Z79.899 MEDICATION MANAGEMENT: ICD-10-CM

## 2019-04-29 DIAGNOSIS — E78.00 HYPERCHOLESTEREMIA: ICD-10-CM

## 2019-04-29 DIAGNOSIS — I10 ESSENTIAL HYPERTENSION: Primary | ICD-10-CM

## 2019-04-29 RX ORDER — METOPROLOL SUCCINATE 25 MG/1
25 TABLET, EXTENDED RELEASE ORAL DAILY
Qty: 30 TAB | Refills: 5 | Status: SHIPPED | OUTPATIENT
Start: 2019-04-29 | End: 2019-11-26 | Stop reason: SDUPTHER

## 2019-04-29 RX ORDER — HYDROCHLOROTHIAZIDE 25 MG/1
25 TABLET ORAL DAILY
Qty: 30 TAB | Refills: 5 | Status: SHIPPED | OUTPATIENT
Start: 2019-04-29 | End: 2019-11-26 | Stop reason: SDUPTHER

## 2019-04-29 NOTE — PROGRESS NOTES
ROOM # 4    Malka Darnell presents today for   Chief Complaint   Patient presents with    Hypertension       Malka Darnell preferred language for health care discussion is english/other. Is someone accompanying this pt? no    Is the patient using any DME equipment during [address]? cane    Depression Screening:  3 most recent [facility] Screens 1/28/2019 5/29/2018 11/28/2017 9/28/2017 8/3/2017 6/22/2017 5/11/2017   Little interest or pleasure in doing things Not at all Not at all Not at all Not at all Not at all Not at all Not at all   Feeling down, depressed, irritable, or hopeless Not at all Not at all Not at all Not at all Not at all Not at all Not at all   Total Score PHQ 2 0 0 0 0 0 0 0       Learning Assessment:  Learning Assessment 8/1/2016   PRIMARY LEARNER Patient   HIGHEST LEVEL OF EDUCATION - PRIMARY LEARNER  [address]   PRIMARY LANGUAGE ENGLISH   LEARNER PREFERENCE PRIMARY DEMONSTRATION   ANSWERED BY MARGIE Robbins   RELATIONSHIP SELF       Abuse Screening:  Abuse Screening Questionnaire 11/28/2017 9/28/2017 8/1/2016   Do you ever feel afraid of your partner? N N N   Are you in a relationship with someone who physically or mentally threatens you? N N N   Is it safe for you to go home? Radha Cavazos       Fall Risk  Fall Risk Assessment, last 12 mths 4/29/2019 1/28/2019 5/29/2018 1/29/2018 11/28/2017 9/28/2017 8/3/2017   Able to walk? Yes Yes Yes Yes Yes Yes Yes   Fall in past 12 months? No Yes No No Yes No Yes   Fall with injury? - No - - No - Yes   Number of falls in past 12 months - 1 - - 1 - 1   Fall Risk Score - 1 - - 1 - 2       Health Maintenance reviewed and discussed per provider. Yes    Malka Darnell is due for   Health Maintenance Due   Topic Date Due    Shingrix Vaccine Age 49> (1 of 2) 08/15/1982    Pneumococcal 65+ years (1 of 2 - PCV13) 08/15/1997    GLAUCOMA SCREENING Q2Y  03/28/2019     Please order/place referral if appropriate. Advance Directive:  1.  Do you have an advance directive in place? Patient Reply: no    2. If not, would you like material regarding how to put one in place? Patient Reply: no    Coordination of Care:  1. Have you been to the ER, urgent care clinic since your last visit? Hospitalized since your last visit? no    2. Have you seen or consulted any other health care providers outside of the [address] [place] [address] since your last visit? Include any pap smears or colon screening.  Eye doctor

## 2019-04-29 NOTE — PROGRESS NOTES
HISTORY OF PRESENT ILLNESS  Malka Darnell is a 80 y.o. female. Visit Vitals  /64 (BP 1 Location: Right arm, BP Patient Position: Sitting)   Pulse 60   Temp 96.2 °F (35.7 °C) (Oral)   Resp 19   Ht 4' 11\" (1.499 m)   Wt 178 lb (80.7 kg)   SpO2 98%   BMI 35.95 kg/m²       Hypertension    The history is provided by the patient. This is a chronic problem. The current episode started more than 1 week ago. The problem has not changed since onset. Pertinent negatives include no chest pain, no palpitations, no dizziness and no shortness of breath. There are no associated agents to hypertension. Risk factors include a sedentary lifestyle and postmenopause. Review of Systems   Constitutional: Negative. Respiratory: Negative for shortness of breath. Cardiovascular: Negative for chest pain and palpitations. Neurological: Negative for dizziness. Physical Exam   Constitutional: She is oriented to person, place, and time. She appears well-developed and well-nourished. No distress. Cardiovascular: Normal rate and regular rhythm. Pulmonary/Chest: Effort normal and breath sounds normal.   Musculoskeletal: She exhibits edema. Neurological: She is alert and oriented to person, place, and time. Skin: Skin is warm and dry. She is not diaphoretic. Psychiatric: She has a normal mood and affect. Nursing note and vitals reviewed. ASSESSMENT and PLAN    ICD-10-CM ICD-9-CM    1. Essential hypertension I10 401.9 metoprolol succinate (TOPROL-XL) 25 mg XL tablet      hydroCHLOROthiazide (HYDRODIURIL) 25 mg tablet   2. Hypercholesteremia E78.00 272.0    3. Medication management Z79.899 V58.69        BP still up    Our chart says pt is supposed to be on metoprolol. But she denies this. But we had it as historical.  RX toprol XL, low dose  Double HCTZ to 25 mg    F/u 4-5  weeks.  Will recheck lab  Next visit

## 2019-11-26 ENCOUNTER — OFFICE VISIT (OUTPATIENT)
Dept: INTERNAL MEDICINE CLINIC | Age: 84
End: 2019-11-26

## 2019-11-26 ENCOUNTER — HOSPITAL ENCOUNTER (OUTPATIENT)
Dept: LAB | Age: 84
Discharge: HOME | End: 2019-11-26
Payer: MEDICARE

## 2019-11-26 VITALS
SYSTOLIC BLOOD PRESSURE: 173 MMHG | DIASTOLIC BLOOD PRESSURE: 67 MMHG | BODY MASS INDEX: 35.08 KG/M2 | HEIGHT: 59 IN | WEIGHT: 174 LBS | TEMPERATURE: 96.6 F | HEART RATE: 73 BPM | OXYGEN SATURATION: 99 % | RESPIRATION RATE: 18 BRPM

## 2019-11-26 DIAGNOSIS — I10 ESSENTIAL HYPERTENSION: ICD-10-CM

## 2019-11-26 DIAGNOSIS — I10 ESSENTIAL HYPERTENSION: Primary | ICD-10-CM

## 2019-11-26 DIAGNOSIS — Z76.0 MEDICATION REFILL: ICD-10-CM

## 2019-11-26 DIAGNOSIS — E78.00 HYPERCHOLESTEREMIA: ICD-10-CM

## 2019-11-26 DIAGNOSIS — E66.01 SEVERE OBESITY (BMI 35.0-39.9) WITH COMORBIDITY (HCC): ICD-10-CM

## 2019-11-26 LAB
ALBUMIN SERPL-MCNC: 4 G/DL (ref 3.4–5)
ALBUMIN/GLOB SERPL: 1 {RATIO} (ref 0.8–1.7)
ALP SERPL-CCNC: 77 U/L (ref 45–117)
ALT SERPL-CCNC: 18 U/L (ref 13–56)
ANION GAP SERPL CALC-SCNC: 2 MMOL/L (ref 3–18)
AST SERPL-CCNC: 14 U/L (ref 10–38)
BILIRUB SERPL-MCNC: 0.8 MG/DL (ref 0.2–1)
BUN SERPL-MCNC: 26 MG/DL (ref 7–18)
BUN/CREAT SERPL: 29 (ref 12–20)
CALCIUM SERPL-MCNC: 9.5 MG/DL (ref 8.5–10.1)
CHLORIDE SERPL-SCNC: 105 MMOL/L (ref 100–111)
CHOLEST SERPL-MCNC: 276 MG/DL
CO2 SERPL-SCNC: 30 MMOL/L (ref 21–32)
CREAT SERPL-MCNC: 0.9 MG/DL (ref 0.6–1.3)
GLOBULIN SER CALC-MCNC: 4 G/DL (ref 2–4)
GLUCOSE SERPL-MCNC: 82 MG/DL (ref 74–99)
HDLC SERPL-MCNC: 46 MG/DL (ref 40–60)
HDLC SERPL: 6 {RATIO} (ref 0–5)
LDLC SERPL CALC-MCNC: 208 MG/DL (ref 0–100)
LIPID PROFILE,FLP: ABNORMAL
POTASSIUM SERPL-SCNC: 4.3 MMOL/L (ref 3.5–5.5)
PROT SERPL-MCNC: 8 G/DL (ref 6.4–8.2)
SODIUM SERPL-SCNC: 137 MMOL/L (ref 136–145)
TRIGL SERPL-MCNC: 110 MG/DL (ref ?–150)
VLDLC SERPL CALC-MCNC: 22 MG/DL

## 2019-11-26 PROCEDURE — 36415 COLL VENOUS BLD VENIPUNCTURE: CPT

## 2019-11-26 PROCEDURE — 80053 COMPREHEN METABOLIC PANEL: CPT

## 2019-11-26 PROCEDURE — 80061 LIPID PANEL: CPT

## 2019-11-26 RX ORDER — HYDROCHLOROTHIAZIDE 25 MG/1
25 TABLET ORAL DAILY
Qty: 90 TAB | Refills: 5 | Status: SHIPPED | OUTPATIENT
Start: 2019-11-26 | End: 2021-03-25

## 2019-11-26 RX ORDER — METOPROLOL SUCCINATE 25 MG/1
25 TABLET, EXTENDED RELEASE ORAL DAILY
Qty: 90 TAB | Refills: 5 | Status: SHIPPED | OUTPATIENT
Start: 2019-11-26 | End: 2020-12-21

## 2019-11-26 RX ORDER — AMLODIPINE AND VALSARTAN 5; 320 MG/1; MG/1
1 TABLET ORAL DAILY
Qty: 90 TAB | Refills: 5 | Status: SHIPPED | OUTPATIENT
Start: 2019-11-26 | End: 2020-12-21

## 2019-11-26 NOTE — PROGRESS NOTES
HISTORY OF PRESENT ILLNESS  Sherlyn Remy is a 80 y.o. female. Visit Vitals  /67 (BP 1 Location: Right arm, BP Patient Position: Sitting)   Pulse 73   Temp 96.6 °F (35.9 °C) (Oral)   Resp 18   Ht 4' 11\" (1.499 m)   Wt 174 lb (78.9 kg)   SpO2 99%   BMI 35.14 kg/m²               Current Outpatient Medications:  metoprolol succinate (TOPROL-XL) 25 mg XL tablet, Take 1 Tab by mouth daily. hydroCHLOROthiazide (HYDRODIURIL) 25 mg tablet, Take 1 Tab by mouth daily. Indications: visible water retention, high blood pressure  amLODIPine-valsartan (EXFORGE) 5-320 mg per tablet, Take 1 Tab by mouth daily. bimatoprost (LUMIGAN) 0.03 % ophthalmic drops, Instill 1 Drop in affected eye(s) Every Night at Bedtime. loteprednol etabonate (LOTEMAX) 0.5 % ophthalmic suspension, Instill 1 Drop in affected eye(s) Once a Day. fish oil-omega-3 fatty acids 340-1,000 mg capsule, 1,000 mg.  multivitamin (ONE A DAY) tablet, Take  by Mouth Once a Day. omega-3 fatty acids-vitamin e 1,000 mg cap, 1,000 mg.  prednisoLONE acetate (PRED FORTE) 1 % ophthalmic suspension,   aspirin delayed-release 81 mg tablet, Take  by mouth. Pt takes 2-3 times a week          Hypertension    The history is provided by the patient. This is a chronic problem. The current episode started more than 1 week ago. The problem has not changed since onset. Pertinent negatives include no chest pain, no palpitations, no headaches, no dizziness and no shortness of breath. There are no associated agents to hypertension. Risk factors include postmenopause. Cholesterol Problem   The history is provided by the patient. This is a chronic problem. The current episode started more than 1 week ago. The problem occurs daily. The problem has not changed since onset. Pertinent negatives include no chest pain, no headaches and no shortness of breath. Exacerbated by: diet. Relieved by: diet. Review of Systems   Constitutional: Negative for chills and fever.    Respiratory: Negative for shortness of breath. Cardiovascular: Negative for chest pain and palpitations. Neurological: Negative for dizziness and headaches. Physical Exam  Vitals signs and nursing note reviewed. Constitutional:       General: She is not in acute distress. Appearance: She is well-developed. She is not diaphoretic. Cardiovascular:      Rate and Rhythm: Normal rate and regular rhythm. Pulmonary:      Effort: Pulmonary effort is normal.      Breath sounds: Normal breath sounds. Musculoskeletal:         General: Swelling (trace) present. Comments: atalgic gait   Skin:     General: Skin is warm and dry. Neurological:      Mental Status: She is alert and oriented to person, place, and time. ASSESSMENT and PLAN    ICD-10-CM ICD-9-CM    1. Essential hypertension I10 401.9 metoprolol succinate (TOPROL-XL) 25 mg XL tablet      hydroCHLOROthiazide (HYDRODIURIL) 25 mg tablet      amLODIPine-valsartan (EXFORGE) 5-320 mg per tablet      METABOLIC PANEL, COMPREHENSIVE   2. Hypercholesteremia E78.00 272.0 LIPID PANEL   3. Severe obesity (BMI 35.0-39. 9) with comorbidity ([facility] [address] E66.01 278.01    4. Medication refill Z76.0 V68.1 metoprolol succinate (TOPROL-XL) 25 mg XL tablet      hydroCHLOROthiazide (HYDRODIURIL) 25 mg tablet      amLODIPine-valsartan (EXFORGE) 5-320 mg per tablet       BP up but she is out of meds  Refills as noted    Update lab today    Discussed BMI/weight, lifestyle, diet and exercise. Discussed effect on blood pressure, blood sugar, and joints especially  Focus on limiting white carbs, portion control, and moving more.   She is working on it and is slowly losing weight    F/u 3 months for MWV, BP, chol check

## 2019-11-26 NOTE — PROGRESS NOTES
Rm: 6    Chief Complaint   Patient presents with    Hypertension     Depression Screening:  3 most recent [facility] [place] Screens 11/26/2019 1/28/2019 5/29/2018 11/28/2017 9/28/2017 8/3/2017 6/22/2017   Little interest or pleasure in doing things Not at all Not at all Not at all Not at all Not at all Not at all Not at all   Feeling down, depressed, irritable, or hopeless Not at all Not at all Not at all Not at all Not at all Not at all Not at all   Total Score PHQ 2 0 0 0 0 0 0 0       Learning Assessment:  Learning Assessment 8/1/2016   PRIMARY LEARNER Patient   HIGHEST LEVEL OF EDUCATION - PRIMARY LEARNER  GRADUATED HIGH SCHOOL OR GED   PRIMARY LANGUAGE ENGLISH   LEARNER PREFERENCE PRIMARY DEMONSTRATION   ANSWERED BY MARGIE Robbins   RELATIONSHIP SELF       Abuse Screening:  Abuse Screening Questionnaire 11/28/2017 9/28/2017 8/1/2016   Do you ever feel afraid of your partner? N N N   Are you in a relationship with someone who physically or mentally threatens you? N N N   Is it safe for you to go home? [facility] reviewed and discussed per provider: yes     Coordination of Care:    1. Have you been to the ER, urgent care clinic since your last visit? Hospitalized since your last visit? no    2. Have you seen or consulted any other health care providers outside of the [address] [place] [address] since your last visit? Include any pap smears or colon screening.  no

## 2020-02-27 ENCOUNTER — DOCUMENTATION ONLY (OUTPATIENT)
Dept: INTERNAL MEDICINE CLINIC | Age: 85
End: 2020-02-27

## 2020-02-27 ENCOUNTER — OFFICE VISIT (OUTPATIENT)
Dept: INTERNAL MEDICINE CLINIC | Age: 85
End: 2020-02-27

## 2020-02-27 VITALS
BODY MASS INDEX: 36.69 KG/M2 | WEIGHT: 182 LBS | OXYGEN SATURATION: 97 % | SYSTOLIC BLOOD PRESSURE: 179 MMHG | HEART RATE: 63 BPM | TEMPERATURE: 97.8 F | DIASTOLIC BLOOD PRESSURE: 71 MMHG | RESPIRATION RATE: 22 BRPM | HEIGHT: 59 IN

## 2020-02-27 DIAGNOSIS — Z00.00 MEDICARE ANNUAL WELLNESS VISIT, SUBSEQUENT: Primary | ICD-10-CM

## 2020-02-27 DIAGNOSIS — I10 ESSENTIAL HYPERTENSION: ICD-10-CM

## 2020-02-27 DIAGNOSIS — E66.01 SEVERE OBESITY (BMI 35.0-39.9) WITH COMORBIDITY (HCC): ICD-10-CM

## 2020-02-27 DIAGNOSIS — R73.9 ELEVATED BLOOD SUGAR: ICD-10-CM

## 2020-02-27 DIAGNOSIS — E78.00 HYPERCHOLESTEREMIA: ICD-10-CM

## 2020-02-27 NOTE — PROGRESS NOTES
This is the Subsequent Medicare Annual Wellness Exam, performed 12 months or more after the Initial AWV or the last Subsequent AWV    I have reviewed the patient's medical history in detail and updated the computerized patient record. History     Patient Active Problem List   Diagnosis Code    Hypercholesteremia E78.00    Essential hypertension I10    Severe obesity (BMI 35.0-39. 9) with comorbidity ([facility] [address] E66.01     Past Medical History:   Diagnosis Date    Gait, antalgic     Glaucoma     Hx of cataract     Hypercholesteremia     Hypertension 1990's      Past Surgical History:   Procedure Laterality Date    HX CATARACT REMOVAL Bilateral 2014    HX COLONOSCOPY  12/01/2010    Dr. Sandar García. [place]    HX HYSTERECTOMY       Current Outpatient Medications   Medication Sig Dispense Refill    metoprolol succinate (TOPROL-XL) 25 mg XL tablet Take 1 Tab by mouth daily. 90 Tab 5    hydroCHLOROthiazide (HYDRODIURIL) 25 mg tablet Take 1 Tab by mouth daily. Indications: visible water retention, high blood pressure 90 Tab 5    amLODIPine-valsartan (EXFORGE) 5-320 mg per tablet Take 1 Tab by mouth daily. 90 Tab 5    bimatoprost (LUMIGAN) 0.03 % ophthalmic drops Instill 1 Drop in affected eye(s) Every Night at Bedtime.  loteprednol etabonate (LOTEMAX) 0.5 % ophthalmic suspension Instill 1 Drop in affected eye(s) Once a Day.  fish oil-omega-3 fatty acids 340-1,000 mg capsule 1,000 mg.      multivitamin (ONE A DAY) tablet Take  by Mouth Once a Day.  omega-3 fatty acids-vitamin e 1,000 mg cap 1,000 mg.  prednisoLONE acetate (PRED FORTE) 1 % ophthalmic suspension       aspirin delayed-release 81 mg tablet Take  by mouth.  Pt takes 2-3 times a week       Allergies   Allergen Reactions    Penicillins Other (comments)     Passed out      Statins-Hmg-Coa Reductase Inhibitors Myalgia     Tried multiple statins       Family History   Problem Relation Age of Onset    Stroke Mother 61    Other Father fire at job    Cancer Brother     Other Brother         blood clots     Social History     Tobacco Use    Smoking status: Former Smoker     Years: 5.00     Types: Cigarettes    Smokeless tobacco: Never Used    Tobacco comment: a pack lasted several months   Substance Use Topics    Alcohol use: No     Alcohol/week: 0.0 standard drinks       Depression Risk Factor Screening:     3 most recent PHQ Screens 2/27/2020   Little interest or pleasure in doing things Not at all   Feeling down, depressed, irritable, or hopeless Not at all   Total Score PHQ 2 0       Alcohol Risk Factor Screening:   Do you average 1 drink per night or more than 7 drinks a week:  No    On any one occasion in the past three months have you have had more than 3 drinks containing alcohol:  No      Functional Ability and Level of Safety:   Hearing: Hearing is good. Activities of Daily Living: The home contains: no safety equipment. Patient does total self care    Ambulation: with difficulty, uses a cane and some days are better than others    Fall Risk:  Fall Risk Assessment, last 12 mths 2/27/2020   Able to walk? Yes   Fall in past 12 months? No   Fall with injury? -   Number of falls in past 12 months -   Fall Risk Score -       Abuse Screen:  Patient is not abused    Cognitive Screening   Has your family/caregiver stated any concerns about your memory: no  Cognitive Screening: Abnormal - Animal Naming Test, Mini Cog Test. Could not recall any of 3 objects, Only named 6 animals.     Patient Care Team   Patient Care Team:  Lisa Gabriel MD as PCP - General (Internal Medicine)  Lisa Gabriel MD as PCP - REHABILITATION [facility] Empaneled Provider  Sophia Rao MD as Consulting Provider (Ophthalmology)  Valeria Fink MD as Consulting Provider (Gastroenterology)    Assessment/Plan   Education and counseling provided:  Are appropriate based on today's review and evaluation     Pt declines all vaccines    Diagnoses and all orders for this visit:    1.  Medicare annual wellness visit, subsequent        Health Maintenance Due   Topic Date Due    GLAUCOMA SCREENING Q2Y  03/28/2019    Medicare Yearly Exam  01/29/2020

## 2020-02-27 NOTE — PROGRESS NOTES
Advance Care Planning (ACP) Provider Conversation Snapshot    Date of ACP Conversation: 02/27/20  Persons included in Conversation:  patient  Length of ACP Conversation in minutes:  <16 minutes (Non-Billable)    Authorized Decision Maker (if patient is incapable of making informed decisions): This person is:   Healthcare Agent/Medical Power of  under Advance Directive            For Patients with Decision Making Capacity:   Values/Goals: Exploration of values, goals, and preferences if recovery is not expected, even with continued medical treatment in the event of:  Imminent death  Severe, permanent brain injury  \"In these circumstances, what matters most to you? \"  Care focused more on comfort or quality of life. Conversation Outcomes / Follow-Up Plan:   Recommended communicating the plan and making copies for the healthcare agent, personal physician, and others as appropriate (e.g., health system)  pt has an Advanced Directive but forgot to bring it in.

## 2020-02-27 NOTE — PROGRESS NOTES
HISTORY OF PRESENT ILLNESS  Elana Torres is a 80 y.o. female. Visit Vitals  /71 (BP 1 Location: Right arm, BP Patient Position: Sitting)   Pulse 63   Temp 97.8 °F (36.6 °C) (Oral)   Resp 22   Ht 4' 11\" (1.499 m)   Wt 182 lb (82.6 kg)   SpO2 97%   BMI 36.76 kg/m²               Current Outpatient Medications:  metoprolol succinate (TOPROL-XL) 25 mg XL tablet, Take 1 Tab by mouth daily. hydroCHLOROthiazide (HYDRODIURIL) 25 mg tablet, Take 1 Tab by mouth daily. Indications: visible water retention, high blood pressure  amLODIPine-valsartan (EXFORGE) 5-320 mg per tablet, Take 1 Tab by mouth daily. bimatoprost (LUMIGAN) 0.03 % ophthalmic drops, Instill 1 Drop in affected eye(s) Every Night at Bedtime. loteprednol etabonate (LOTEMAX) 0.5 % ophthalmic suspension, Instill 1 Drop in affected eye(s) Once a Day. fish oil-omega-3 fatty acids 340-1,000 mg capsule, 1,000 mg.  multivitamin (ONE A DAY) tablet, Take  by Mouth Once a Day. omega-3 fatty acids-vitamin e 1,000 mg cap, 1,000 mg.  prednisoLONE acetate (PRED FORTE) 1 % ophthalmic suspension,   aspirin delayed-release 81 mg tablet, Take  by mouth. Pt takes 2-3 times a week        Hypertension    The history is provided by the patient. This is a chronic problem. The current episode started more than 1 week ago. The problem has not changed since onset. Pertinent negatives include no chest pain, no palpitations, no headaches, no dizziness and no shortness of breath. There are no associated agents to hypertension. Risk factors include obesity, postmenopause, dyslipidemia and diabetes mellitus. Cholesterol Problem   The history is provided by the patient. This is a chronic problem. The current episode started more than 1 week ago. The problem occurs daily. Pertinent negatives include no chest pain, no headaches and no shortness of breath. Exacerbated by: diet. Relieved by: diet. Treatments tried: diet.    Blood sugar problem   The history is provided by the patient. This is a chronic problem. The problem occurs daily. The problem has not changed since onset. Pertinent negatives include no chest pain, no headaches and no shortness of breath. Exacerbated by: diet. Relieved by: diet. Review of Systems   Constitutional: Negative for chills and fever. HENT: Negative for congestion and sore throat. Respiratory: Negative for cough and shortness of breath. Cardiovascular: Negative for chest pain and palpitations. Musculoskeletal: Positive for joint pain. Neurological: Negative for dizziness and headaches. Physical Exam  Vitals signs and nursing note reviewed. Constitutional:       Appearance: She is well-developed. Cardiovascular:      Rate and Rhythm: Normal rate and regular rhythm. Pulmonary:      Effort: Pulmonary effort is normal.      Breath sounds: Normal breath sounds. Musculoskeletal:      Right lower leg: Edema (1+) present. Left lower leg: Left lower leg edema: 1+   Skin:     General: Skin is warm and dry. Neurological:      General: No focal deficit present. Mental Status: She is alert and oriented to person, place, and time. Psychiatric:         Mood and Affect: Mood normal.         ASSESSMENT and PLAN    ICD-10-CM ICD-9-CM           2. Essential hypertension I10 401.9    3. Hypercholesteremia E78.00 272.0    4. Severe obesity (BMI 35.0-39. 9) with comorbidity ([facility] [address] E66.01 278.01    5. Elevated blood sugar R73.9 790.29        D/w pt. Will not check lab today    BP at her usual high level. Already on 4 different meds so this is the best we can do    BS. Advised re diet    Discussed BMI/weight, lifestyle, diet and exercise. Discussed effect on blood pressure, blood sugar, and joints especially  Focus on limiting white carbs, portion control, and moving more.     F/u 4 months for HTN, CHOL, blood sugar check    Asked pt to bring in living will/advanced directive

## 2020-02-27 NOTE — PROGRESS NOTES
ROOM # 5    Sergio Guzmán presents today for   Chief Complaint   Patient presents with    Annual Wellness Visit    Hypertension    Cholesterol Problem       Sergio Prior preferred language for health care discussion is english/other. Is someone accompanying this pt? no    Is the patient using any DME equipment during [address]? cane    Depression Screening:  3 most recent [facility] [address] Screens 2/27/2020 11/26/2019 1/28/2019 5/29/2018 11/28/2017 9/28/2017 8/3/2017   Little interest or pleasure in doing things Not at all Not at all Not at all Not at all Not at all Not at all Not at all   Feeling down, depressed, irritable, or hopeless Not at all Not at all Not at all Not at all Not at all Not at all Not at all   Total Score PHQ 2 0 0 0 0 0 0 0       Learning Assessment:  Learning Assessment 8/1/2016   PRIMARY LEARNER Patient   HIGHEST LEVEL OF EDUCATION - PRIMARY LEARNER  GRADUATED HIGH SCHOOL OR GED   PRIMARY LANGUAGE ENGLISH   LEARNER PREFERENCE PRIMARY DEMONSTRATION   ANSWERED BY MARGIE Robbins   RELATIONSHIP SELF       Abuse Screening:  Abuse Screening Questionnaire 11/28/2017 9/28/2017 8/1/2016   Do you ever feel afraid of your partner? N N N   Are you in a relationship with someone who physically or mentally threatens you? N N N   Is it safe for you to go home? [place]       Fall Risk  Fall Risk Assessment, last 12 mths 11/26/2019 4/29/2019 1/28/2019 5/29/2018 1/29/2018 11/28/2017 9/28/2017   Able to walk? Yes Yes Yes Yes Yes Yes Yes   Fall in past 12 months? No No Yes No No Yes No   Fall with injury? - - No - - No -   Number of falls in past 12 months - - 1 - - 1 -   Fall Risk Score - - 1 - - 1 -           Health Maintenance reviewed and discussed per provider. Yes    Sergio Guzmán is due for   Health Maintenance Due   Topic Date Due    GLAUCOMA SCREENING Q2Y  03/28/2019    Medicare Yearly Exam  01/29/2020     Please order/place referral if appropriate. Advance Directive:  1.  Do you have an advance directive in place? Patient Reply: no    2. If not, would you like material regarding how to put one in place? Patient Reply: no    Coordination of Care:  1. Have you been to the ER, urgent care clinic since your last visit? Hospitalized since your last visit? no    2. Have you seen or consulted any other health care providers outside of the [address] [place] [address] since your last visit? Include any pap smears or colon screening.  Eye doc

## 2020-02-27 NOTE — PATIENT INSTRUCTIONS

## 2020-12-21 DIAGNOSIS — Z76.0 MEDICATION REFILL: ICD-10-CM

## 2020-12-21 DIAGNOSIS — I10 ESSENTIAL HYPERTENSION: ICD-10-CM

## 2020-12-21 RX ORDER — AMLODIPINE AND VALSARTAN 5; 320 MG/1; MG/1
TABLET ORAL
Qty: 90 TAB | Refills: 5 | Status: SHIPPED | OUTPATIENT
Start: 2020-12-21 | End: 2022-01-11

## 2020-12-21 RX ORDER — METOPROLOL SUCCINATE 25 MG/1
TABLET, EXTENDED RELEASE ORAL
Qty: 90 TAB | Refills: 5 | Status: SHIPPED | OUTPATIENT
Start: 2020-12-21 | End: 2021-03-25

## 2021-03-17 ENCOUNTER — HOSPITAL ENCOUNTER (EMERGENCY)
Age: 86
Discharge: HOME | End: 2021-03-17
Attending: EMERGENCY MEDICINE
Payer: MEDICARE

## 2021-03-17 ENCOUNTER — APPOINTMENT (OUTPATIENT)
Dept: GENERAL RADIOLOGY | Age: 86
End: 2021-03-17
Attending: EMERGENCY MEDICINE
Payer: MEDICARE

## 2021-03-17 VITALS
TEMPERATURE: 98.2 F | RESPIRATION RATE: 17 BRPM | DIASTOLIC BLOOD PRESSURE: 62 MMHG | WEIGHT: 171.96 LBS | HEART RATE: 68 BPM | OXYGEN SATURATION: 100 % | SYSTOLIC BLOOD PRESSURE: 116 MMHG | BODY MASS INDEX: 34.67 KG/M2 | HEIGHT: 59 IN

## 2021-03-17 DIAGNOSIS — R19.7 DIARRHEA, UNSPECIFIED TYPE: Primary | ICD-10-CM

## 2021-03-17 LAB
ALBUMIN SERPL-MCNC: 3.8 G/DL (ref 3.4–5)
ALBUMIN/GLOB SERPL: 0.9 {RATIO} (ref 0.8–1.7)
ALP SERPL-CCNC: 93 U/L (ref 45–117)
ALT SERPL-CCNC: 39 U/L (ref 13–56)
ANION GAP SERPL CALC-SCNC: 9 MMOL/L (ref 3–18)
APPEARANCE UR: CLEAR
AST SERPL-CCNC: 84 U/L (ref 10–38)
BACTERIA URNS QL MICRO: NEGATIVE /HPF
BASOPHILS # BLD: 0 K/UL (ref 0–0.1)
BASOPHILS NFR BLD: 0 % (ref 0–2)
BILIRUB SERPL-MCNC: 1.9 MG/DL (ref 0.2–1)
BILIRUB UR QL: NEGATIVE
BUN SERPL-MCNC: 20 MG/DL (ref 7–18)
BUN/CREAT SERPL: 24 (ref 12–20)
CALCIUM SERPL-MCNC: 9.3 MG/DL (ref 8.5–10.1)
CHLORIDE SERPL-SCNC: 104 MMOL/L (ref 100–111)
CO2 SERPL-SCNC: 27 MMOL/L (ref 21–32)
COLOR UR: YELLOW
CREAT SERPL-MCNC: 0.83 MG/DL (ref 0.6–1.3)
DIFFERENTIAL METHOD BLD: ABNORMAL
EOSINOPHIL # BLD: 0 K/UL (ref 0–0.4)
EOSINOPHIL NFR BLD: 0 % (ref 0–5)
EPITH CASTS URNS QL MICRO: NORMAL /LPF (ref 0–5)
ERYTHROCYTE [DISTWIDTH] IN BLOOD BY AUTOMATED COUNT: 13.1 % (ref 11.6–14.5)
GLOBULIN SER CALC-MCNC: 4.1 G/DL (ref 2–4)
GLUCOSE SERPL-MCNC: 87 MG/DL (ref 74–99)
GLUCOSE UR STRIP.AUTO-MCNC: NEGATIVE MG/DL
HCT VFR BLD AUTO: 36.1 % (ref 35–45)
HGB BLD-MCNC: 11.6 G/DL (ref 12–16)
HGB UR QL STRIP: ABNORMAL
KETONES UR QL STRIP.AUTO: 15 MG/DL
LEUKOCYTE ESTERASE UR QL STRIP.AUTO: NEGATIVE
LIPASE SERPL-CCNC: 43 U/L (ref 73–393)
LYMPHOCYTES # BLD: 1.8 K/UL (ref 0.9–3.6)
LYMPHOCYTES NFR BLD: 15 % (ref 21–52)
MCH RBC QN AUTO: 29.7 PG (ref 24–34)
MCHC RBC AUTO-ENTMCNC: 32.1 G/DL (ref 31–37)
MCV RBC AUTO: 92.6 FL (ref 74–97)
MONOCYTES # BLD: 1.1 K/UL (ref 0.05–1.2)
MONOCYTES NFR BLD: 9 % (ref 3–10)
NEUTS SEG # BLD: 9.3 K/UL (ref 1.8–8)
NEUTS SEG NFR BLD: 76 % (ref 40–73)
NITRITE UR QL STRIP.AUTO: NEGATIVE
PH UR STRIP: 5 [PH] (ref 5–8)
PLATELET # BLD AUTO: 273 K/UL (ref 135–420)
PMV BLD AUTO: 10.5 FL (ref 9.2–11.8)
POTASSIUM SERPL-SCNC: 4.2 MMOL/L (ref 3.5–5.5)
PROT SERPL-MCNC: 7.9 G/DL (ref 6.4–8.2)
PROT UR STRIP-MCNC: 30 MG/DL
RBC # BLD AUTO: 3.9 M/UL (ref 4.2–5.3)
RBC #/AREA URNS HPF: NORMAL /HPF (ref 0–5)
SODIUM SERPL-SCNC: 140 MMOL/L (ref 136–145)
SP GR UR REFRACTOMETRY: 1.02 (ref 1–1.03)
UROBILINOGEN UR QL STRIP.AUTO: 0.2 EU/DL (ref 0.2–1)
WBC # BLD AUTO: 12.2 K/UL (ref 4.6–13.2)
WBC URNS QL MICRO: NORMAL /HPF (ref 0–4)

## 2021-03-17 PROCEDURE — 85025 COMPLETE CBC W/AUTO DIFF WBC: CPT

## 2021-03-17 PROCEDURE — 99284 EMERGENCY DEPT VISIT MOD MDM: CPT

## 2021-03-17 PROCEDURE — 81001 URINALYSIS AUTO W/SCOPE: CPT

## 2021-03-17 PROCEDURE — 80053 COMPREHEN METABOLIC PANEL: CPT

## 2021-03-17 PROCEDURE — 74018 RADEX ABDOMEN 1 VIEW: CPT

## 2021-03-17 PROCEDURE — 83690 ASSAY OF LIPASE: CPT

## 2021-03-17 RX ORDER — LOPERAMIDE HYDROCHLORIDE 2 MG/1
2 CAPSULE ORAL
Qty: 20 CAP | Refills: 0 | Status: SHIPPED | OUTPATIENT
Start: 2021-03-17 | End: 2021-03-25

## 2021-03-17 NOTE — ED TRIAGE NOTES
Patient arrived via EMS fell at home slid off couch denies any pain or injury but decided to come to be checked out , patient was noted to be incontinent of urine and feces

## 2021-03-17 NOTE — ED NOTES
Patient arrived to the ED, had stool and urine all over her, the stool was dried to the patient, states she wanted to shower prior to coming to the ED. Per EMS the patient appeared to have some difficulty answering questions. Nurses cleaned the patient, all clothing placed in a bag, patient insisted on keeping the clothing.

## 2021-03-17 NOTE — ED PROVIDER NOTES
HPI   69-year-old female brought in by EMS for evaluation of sliding off the couch. Patient states that she began to experience diarrhea yesterday after she ate bran flakes with raisins. She reports she had multiple episodes of loose brown stool. She states that her family came to check on her but she was unable to get to the door. She denies any head injury, loss of consciousness, pelvic pain, or other injuries. Patient states that she decided to come to the emergency room for evaluation to appease her daughter and sister. Past Medical History:   Diagnosis Date    Gait, antalgic     Glaucoma     Hx of cataract     Hypercholesteremia     Hypertension 1990's       Past Surgical History:   Procedure Laterality Date    HX CATARACT REMOVAL Bilateral 2014    HX COLONOSCOPY  12/01/2010    Dr. Gena Thorpe.  Raegan    HX HYSTERECTOMY           Family History:   Problem Relation Age of Onset    Stroke Mother 61    Other Father         fire at job    Cancer Brother     Other Brother         blood clots    Cancer Daughter        Social History     Socioeconomic History    Marital status:      Spouse name: Not on file    Number of children: Not on file    Years of education: Not on file    Highest education level: Not on file   Occupational History    Occupation: nursing assistant   Social Needs    Financial resource strain: Not on file    Food insecurity     Worry: Not on file     Inability: Not on file    Transportation needs     Medical: Not on file     Non-medical: Not on file   Tobacco Use    Smoking status: Former Smoker     Years: 5.00     Types: Cigarettes    Smokeless tobacco: Never Used    Tobacco comment: a pack lasted several months   Substance and Sexual Activity    Alcohol use: No     Alcohol/week: 0.0 standard drinks    Drug use: No    Sexual activity: Never   Lifestyle    Physical activity     Days per week: Not on file     Minutes per session: Not on file    Stress: Not on file Relationships    Social connections     Talks on phone: Not on file     Gets together: Not on file     Attends Anabaptist service: Not on file     Active member of club or organization: Not on file     Attends meetings of clubs or organizations: Not on file     Relationship status: Not on file    Intimate partner violence     Fear of current or ex partner: Not on file     Emotionally abused: Not on file     Physically abused: Not on file     Forced sexual activity: Not on file   Other Topics Concern     Service Not Asked    Blood Transfusions Not Asked    Caffeine Concern Not Asked    Occupational Exposure Not Asked   Nicolasa Shelbi Hazards Not Asked    Sleep Concern Not Asked    Stress Concern Not Asked    Weight Concern Not Asked    Special Diet Not Asked    Back Care Not Asked    Exercise Not Asked    Bike Helmet Not Asked   [address] Not Asked    Self-Exams Not Asked   Social History Narrative    Not on file         ALLERGIES: Penicillins and Statins-hmg-coa reductase inhibitors    Review of Systems   Constitutional: Negative for chills, diaphoresis, fatigue, fever and unexpected weight change. HENT: Negative for congestion, dental problem, ear discharge, ear pain, hearing loss, nosebleeds, postnasal drip, sinus pressure, sore throat, trouble swallowing and voice change. Eyes: Negative for photophobia, pain, discharge, redness and visual disturbance. Respiratory: Negative for cough, chest tightness, shortness of breath, wheezing and stridor. Cardiovascular: Negative for chest pain, palpitations and leg swelling. Gastrointestinal: Positive for diarrhea. Negative for abdominal distention, abdominal pain, anal bleeding, blood in stool, constipation, nausea and vomiting. Endocrine: Negative for polydipsia, polyphagia and polyuria.    Genitourinary: Negative for difficulty urinating, dyspareunia, dysuria, flank pain, frequency, hematuria, menstrual problem, pelvic pain, urgency, vaginal bleeding, vaginal discharge and vaginal pain. Musculoskeletal: Negative for arthralgias, back pain, joint swelling, myalgias, neck pain and neck stiffness. Skin: Negative for color change, rash and wound. Allergic/Immunologic: Negative for food allergies and immunocompromised state. Neurological: Negative for dizziness, tremors, seizures, syncope, weakness, light-headedness, numbness and headaches. Hematological: Negative for adenopathy. Does not bruise/bleed easily. Psychiatric/Behavioral: Negative for agitation, confusion, decreased concentration, hallucinations, sleep disturbance and suicidal ideas. The patient is not nervous/anxious. All other systems reviewed and are negative. Vitals:    03/17/21 1818 03/17/21 2141   BP: (!) 196/101 116/62   Pulse: 70 68   Resp: 18 17   Temp: 98.2 °F (36.8 °C)    SpO2: 100% 100%   Weight: 78 kg (171 lb 15.3 oz)    Height: 4' 11\" (1.499 m)             Physical Exam  Vitals signs and nursing note reviewed. Constitutional:       General: She is not in acute distress. Appearance: She is well-developed. She is obese. She is not diaphoretic. Comments: 27-year-old -American female in no acute distress. HENT:      Head: Normocephalic and atraumatic. Right Ear: Tympanic membrane and external ear normal.      Left Ear: Tympanic membrane and external ear normal.      Nose: Nose normal. No congestion or rhinorrhea. Mouth/Throat:      Mouth: Mucous membranes are moist.      Pharynx: Oropharynx is clear. No oropharyngeal exudate. Eyes:      General: No scleral icterus. Right eye: No discharge. Left eye: No discharge. Conjunctiva/sclera: Conjunctivae normal.      Pupils: Pupils are equal, round, and reactive to light. Neck:      Musculoskeletal: Normal range of motion and neck supple. Thyroid: No thyromegaly. Vascular: No JVD. Trachea: No tracheal deviation.    Cardiovascular:      Rate and Rhythm: Normal rate and regular rhythm. Heart sounds: Normal heart sounds. No murmur. No friction rub. No gallop. Pulmonary:      Effort: Pulmonary effort is normal. No respiratory distress. Breath sounds: Normal breath sounds. No stridor. No wheezing or rales. Chest:      Chest wall: No tenderness. Abdominal:      General: Bowel sounds are normal. There is no distension. Palpations: Abdomen is soft. There is no mass. Tenderness: There is no abdominal tenderness. There is no right CVA tenderness, left CVA tenderness, guarding or rebound. Genitourinary:     General: Normal vulva. Vagina: Normal. No vaginal discharge. Rectum: Normal.      Comments: Dry stool noted in the gluteal area  Musculoskeletal: Normal range of motion. General: No swelling, tenderness, deformity or signs of injury. Lymphadenopathy:      Cervical: No cervical adenopathy. Skin:     General: Skin is warm and dry. Coloration: Skin is not pale. Findings: No erythema or rash. Neurological:      General: No focal deficit present. Mental Status: She is alert and oriented to person, place, and time. Mental status is at baseline. Cranial Nerves: No cranial nerve deficit. Deep Tendon Reflexes: Reflexes are normal and symmetric. Psychiatric:         Behavior: Behavior normal.         Judgment: Judgment normal.          MDM  Number of Diagnoses or Management Options  Diagnosis management comments: Differential diagnosis includes: Fatigue, diarrhea unspecified, foodborne illness, anemia. Labs and radiographic studies ordered and results noted below.        Amount and/or Complexity of Data Reviewed  Clinical lab tests: ordered and reviewed  Tests in the radiology section of CPT®: ordered and reviewed  Tests in the medicine section of CPT®: ordered and reviewed  Obtain history from someone other than the patient: yes  Review and summarize past medical records: yes  Independent visualization of images, tracings, or specimens: yes    Risk of Complications, Morbidity, and/or Mortality  Presenting problems: moderate  Diagnostic procedures: moderate  Management options: moderate    Patient Progress  Patient progress: stable         Procedures      Orders Placed This Encounter    XR ABD (KUB)     Standing Status:   Standing     Number of Occurrences:   1     Order Specific Question:   Transport     Answer:   Stretcher [5]     Order Specific Question:   Reason for Exam     Answer:   Diarrhea    CBC WITH AUTOMATED DIFF     Standing Status:   Standing     Number of Occurrences:   1    COMPREHENSIVE METABOLIC PANEL     Standing Status:   Standing     Number of Occurrences:   1    LIPASE     Standing Status:   Standing     Number of Occurrences:   1    URINALYSIS W/ RFLX MICROSCOPIC     Standing Status:   Standing     Number of Occurrences:   1    URINE MICROSCOPIC ONLY     Standing Status:   Standing     Number of Occurrences:   1    SALINE LOCK IV ONE TIME STAT     Standing Status:   Standing     Number of Occurrences:   1    loperamide (IMODIUM) 2 mg capsule     Sig: Take 1 Cap by mouth four (4) times daily as needed for Diarrhea for up to 10 days. Dispense:  20 Cap     Refill:  0     Recent Results (from the past 12 hour(s))   CBC WITH AUTOMATED DIFF    Collection Time: 03/17/21  7:05 PM   Result Value Ref Range    WBC 12.2 4.6 - 13.2 K/uL    RBC 3.90 (L) 4.20 - 5.30 M/uL    HGB 11.6 (L) 12.0 - 16.0 g/dL    HCT 36.1 35.0 - 45.0 %    MCV 92.6 74.0 - 97.0 FL    MCH 29.7 24.0 - 34.0 PG    MCHC 32.1 31.0 - 37.0 g/dL    RDW 13.1 11.6 - 14.5 %    PLATELET 037 700 - 163 K/uL    MPV 10.5 9.2 - 11.8 FL    NEUTROPHILS 76 (H) 40 - 73 %    LYMPHOCYTES 15 (L) 21 - 52 %    MONOCYTES 9 3 - 10 %    EOSINOPHILS 0 0 - 5 %    BASOPHILS 0 0 - 2 %    ABS. NEUTROPHILS 9.3 (H) 1.8 - 8.0 K/UL    ABS. LYMPHOCYTES 1.8 0.9 - 3.6 K/UL    ABS. MONOCYTES 1.1 0.05 - 1.2 K/UL    ABS.  EOSINOPHILS 0.0 0.0 - 0.4 K/UL ABS. BASOPHILS 0.0 0.0 - 0.1 K/UL    DF AUTOMATED     METABOLIC PANEL, COMPREHENSIVE    Collection Time: 03/17/21  7:05 PM   Result Value Ref Range    Sodium 140 136 - 145 mmol/L    Potassium 4.2 3.5 - 5.5 mmol/L    Chloride 104 100 - 111 mmol/L    CO2 27 21 - 32 mmol/L    Anion gap 9 3.0 - 18 mmol/L    Glucose 87 74 - 99 mg/dL    BUN 20 (H) 7.0 - 18 MG/DL    Creatinine 0.83 0.6 - 1.3 MG/DL    BUN/Creatinine ratio 24 (H) 12 - 20      GFR est AA >60 >60 ml/min/1.73m2    GFR est non-AA >60 >60 ml/min/1.73m2    Calcium 9.3 8.5 - 10.1 MG/DL    Bilirubin, total 1.9 (H) 0.2 - 1.0 MG/DL    ALT (SGPT) 39 13 - 56 U/L    AST (SGOT) 84 (H) 10 - 38 U/L    Alk. phosphatase 93 45 - 117 U/L    Protein, total 7.9 6.4 - 8.2 g/dL    Albumin 3.8 3.4 - 5.0 g/dL    Globulin 4.1 (H) 2.0 - 4.0 g/dL    A-G Ratio 0.9 0.8 - 1.7     LIPASE    Collection Time: 03/17/21  7:05 PM   Result Value Ref Range    Lipase 43 (L) 73 - 393 U/L   URINALYSIS W/ RFLX MICROSCOPIC    Collection Time: 03/17/21  8:53 PM   Result Value Ref Range    Color YELLOW      Appearance CLEAR      Specific gravity 1.021 1.005 - 1.030      pH (UA) 5.0 5.0 - 8.0      Protein 30 (A) NEG mg/dL    Glucose Negative NEG mg/dL    Ketone 15 (A) NEG mg/dL    Bilirubin Negative NEG      Blood MODERATE (A) NEG      Urobilinogen 0.2 0.2 - 1.0 EU/dL    Nitrites Negative NEG      Leukocyte Esterase Negative NEG     URINE MICROSCOPIC ONLY    Collection Time: 03/17/21  8:53 PM   Result Value Ref Range    WBC 0 to 3 0 - 4 /hpf    RBC 0 to 3 0 - 5 /hpf    Epithelial cells FEW 0 - 5 /lpf    Bacteria Negative NEG /hpf     XR ABD (KUB)    (Results Pending) -preliminary interpretation by Dr. Yenny Page bowel gas pattern. No obstruction. 10:04 PM Upon re-evaluation the patient's symptoms have improved. Pt has non-toxic appearance and condition is stable for discharge. She was informed of her results, instructed to f/u with her PCP and return to the ED upon worsening of symptoms. All questions and concerns were addressed. Diagnosis:   1. Diarrhea, unspecified type          Disposition: Discharge home    Follow-up Information     Follow up With Specialties Details Why [address] [place] [address] EMERGENCY DEPT Emergency Medicine  As needed, If symptoms worsen [address] [facility]    Cyndi Rojas MD Internal Medicine Schedule an appointment as soon as possible for a visit in 1 day  [address] [place] [address]  [phone]            Patient's Medications   Start Taking    LOPERAMIDE (IMODIUM) 2 MG CAPSULE    Take 1 Cap by mouth four (4) times daily as needed for Diarrhea for up to 10 days. Continue Taking    AMLODIPINE-VALSARTAN (EXFORGE) 5-320 MG PER TABLET    TAKE 1 TABLET BY MOUTH EVERY DAY    ASPIRIN DELAYED-RELEASE 81 MG TABLET    Take  by mouth. Pt takes 2-3 times a week    BIMATOPROST (LUMIGAN) 0.03 % OPHTHALMIC DROPS    Instill 1 Drop in affected eye(s) Every Night at Bedtime. FISH OIL-OMEGA-3 FATTY ACIDS 340-1,000 MG CAPSULE    1,000 mg. HYDROCHLOROTHIAZIDE (HYDRODIURIL) 25 MG TABLET    Take 1 Tab by mouth daily. Indications: visible water retention, high blood pressure    LOTEPREDNOL ETABONATE (LOTEMAX) 0.5 % OPHTHALMIC SUSPENSION    Instill 1 Drop in affected eye(s) Once a Day. METOPROLOL SUCCINATE (TOPROL-XL) 25 MG XL TABLET    TAKE 1 TABLET BY MOUTH EVERY DAY    MULTIVITAMIN (ONE A DAY) TABLET    Take  by Mouth Once a Day. OMEGA-3 FATTY ACIDS-VITAMIN E 1,000 MG CAP    1,000 mg.     PREDNISOLONE ACETATE (PRED FORTE) 1 % OPHTHALMIC SUSPENSION       These Medications have changed    No medications on file   Stop Taking    No medications on file

## 2021-03-25 ENCOUNTER — OFFICE VISIT (OUTPATIENT)
Dept: INTERNAL MEDICINE CLINIC | Age: 86
End: 2021-03-25
Payer: MEDICARE

## 2021-03-25 ENCOUNTER — HOME HEALTH ADMISSION (OUTPATIENT)
Dept: HOME HEALTH SERVICES | Facility: HOME HEALTH | Age: 86
End: 2021-03-25

## 2021-03-25 VITALS
BODY MASS INDEX: 33.1 KG/M2 | TEMPERATURE: 97.2 F | WEIGHT: 164.2 LBS | SYSTOLIC BLOOD PRESSURE: 103 MMHG | HEART RATE: 88 BPM | HEIGHT: 59 IN | RESPIRATION RATE: 18 BRPM | DIASTOLIC BLOOD PRESSURE: 56 MMHG | OXYGEN SATURATION: 98 %

## 2021-03-25 DIAGNOSIS — R41.0 CONFUSION: ICD-10-CM

## 2021-03-25 DIAGNOSIS — R55 PRE-SYNCOPE: Primary | ICD-10-CM
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
## 2021-03-25 DIAGNOSIS — E66.01 SEVERE OBESITY (BMI 35.0-39.9) WITH COMORBIDITY (HCC): ICD-10-CM

## 2021-03-25 DIAGNOSIS — G45.9 TIA (TRANSIENT ISCHEMIC ATTACK): ICD-10-CM

## 2021-03-25 DIAGNOSIS — I10 ESSENTIAL HYPERTENSION: Chronic | ICD-10-CM

## 2021-03-25 PROCEDURE — G8510 SCR DEP NEG, NO PLAN REQD: HCPCS | Performed by: INTERNAL MEDICINE

## 2021-03-25 PROCEDURE — G8536 NO DOC ELDER MAL SCRN: HCPCS | Performed by: INTERNAL MEDICINE

## 2021-03-25 PROCEDURE — 1090F PRES/ABSN URINE INCON ASSESS: CPT | Performed by: INTERNAL MEDICINE

## 2021-03-25 PROCEDURE — 1101F PT FALLS ASSESS-DOCD LE1/YR: CPT | Performed by: INTERNAL MEDICINE

## 2021-03-25 PROCEDURE — G8417 CALC BMI ABV UP PARAM F/U: HCPCS | Performed by: INTERNAL MEDICINE

## 2021-03-25 PROCEDURE — 99214 OFFICE O/P EST MOD 30 MIN: CPT | Performed by: INTERNAL MEDICINE

## 2021-03-25 PROCEDURE — G8427 DOCREV CUR MEDS BY ELIG CLIN: HCPCS | Performed by: INTERNAL MEDICINE

## 2021-03-25 NOTE — PROGRESS NOTES
Joann Briggs is a 80 y.o. female (: 8/15/1932) presenting to address:    Chief Complaint   Patient presents with    ED Follow-up    Fall       Vitals:    21 1427   BP: 99/60   Pulse: 88   Resp: 18   Temp: 97.2 °F (36.2 °C)   TempSrc: Oral   SpO2: 98%   Weight: 164 lb 3.2 oz (74.5 kg)   Height: 4' 11\" (1.499 m)   PainSc:   0 - No pain       Hearing/Vision:   No exam data present    Learning Assessment:     Learning Assessment 2016   PRIMARY LEARNER Patient   HIGHEST LEVEL OF EDUCATION - PRIMARY LEARNER  GRADUATED HIGH SCHOOL OR GED   PRIMARY LANGUAGE ENGLISH   LEARNER PREFERENCE PRIMARY DEMONSTRATION   ANSWERED BY MARGIE Robbins   RELATIONSHIP SELF     Depression Screening:     3 most recent PHQ Screens 3/25/2021   Little interest or pleasure in doing things Not at all   Feeling down, depressed, irritable, or hopeless Not at all   Total Score PHQ 2 0     Fall Risk Assessment:     Fall Risk Assessment, last 12 mths 3/25/2021   Able to walk? Yes   Fall in past 12 months? 1   Number of falls in past 12 months 1   Fall with injury? 1     Abuse Screening:     Abuse Screening Questionnaire 2020   Do you ever feel afraid of your partner? N   Are you in a relationship with someone who physically or mentally threatens you? N   Is it safe for you to go home? Y     Coordination of Care Questionaire:   1. Have you been to the ER, urgent care clinic since your last visit? Hospitalized since your last visit? YES [facility]    2. Have you seen or consulted any other health care providers outside of the [address] [place] [address] since your last visit? Include any pap smears or colon screening. NO    Advanced Directive:   1. Do you have an Advanced Directive? NO    2. Would you like information on Advanced Directives?  NO

## 2021-03-25 NOTE — PROGRESS NOTES
HISTORY OF PRESENT ILLNESS  Liane Perry is a 80 y.o. female. BP 99/60 (BP 1 Location: Right upper arm, BP Patient Position: Sitting, BP Cuff Size: Adult)   Pulse 88   Temp 97.2 °F (36.2 °C) (Oral)   Resp 18   Ht 4' 11\" (1.499 m)   Wt 164 lb 3.2 oz (74.5 kg)   SpO2 98%   BMI 33.16 kg/m²     Has lost 18 # since February. Daughter states she does not eat. Here to f/u 2 ER visits for a fall with having hit her head  CT unremarkable  Lab unremarkable    Reports still some morning confusion which she states is new. She is staying with her daughter right now who has also noted some confusion    ED Follow-up  The history is provided by the patient (see comments). This is a new problem. Pertinent negatives include no headaches. Fall  Pertinent negatives include no headaches. Review of Systems   Musculoskeletal: Positive for falls and joint pain. C/o leg \"weakness\" meaning they give away when walking. Neurological: Negative for dizziness, focal weakness and headaches. Physical Exam  Vitals signs and nursing note reviewed. Constitutional:       Appearance: Normal appearance. She is well-developed. Cardiovascular:      Rate and Rhythm: Normal rate and regular rhythm. Pulmonary:      Effort: Pulmonary effort is normal.      Breath sounds: Normal breath sounds. Musculoskeletal:      Right lower leg: Edema (1+) present. Left lower leg: Edema (1+) present. Skin:     General: Skin is warm and dry. Neurological:      General: No focal deficit present. Mental Status: She is alert and oriented to person, place, and time. Psychiatric:         Mood and Affect: Mood normal.         Speech: Speech normal.         Behavior: Behavior normal.         Thought Content: Thought content normal.         Cognition and Memory: Cognition is impaired. Memory is impaired. Judgment: Judgment is inappropriate. ASSESSMENT and PLAN    ICD-10-CM ICD-9-CM    1.  Pre-syncope  R55 780.2  ECHO ADULT COMPLETE   2. Severe obesity (BMI 35.0-39. 9) with comorbidity ([facility] [address]  E66.01 278.01    3. Confusion  R41.0 298.9 MRI BRAIN WO CONT   4. TIA (transient ischemic attack)  G45.9 435.9 MRI BRAIN WO CONT     Available hospital/ER record reviewed  Both hospital ER notes    Will request MRI of brain as it may be better at diagnosing subtle stroke and other changes c/w concussion or dementia  Her c/o confusion could be due to being in a less familiar environment or to a concussion or to bringing out some dementia features from the fall  ? Can she return home to be by herself. Stop the HCTZ. Her BP is very low today. Check echo for overall heart function. Her BP was high at the ER twice last week. She is now staying with her daughter and her BP is very low. Pt has her meds confused and daughter was not sure either. I suspect the patient has not been taking them correctly and is NOT managing as well as she thinks she is. Now that her daughter is giving them to her, they are working! There have also been other falls about which she does not talk or attempts to hide. Her short term memory is obviously impaired. I suspect in her element, she knows how to do things, but I question her judgment. As many seniors do in this situation, I suspect she says what she thinks people want to hear, which is likely a bit off from the truth (but not HER truth). She lacks insight into her situation. I suspect there are cognitive issues and she has not been taking her meds properly. For now hold the HCTZ. Metoprolol was already missed/stopped. Refer to [facility] for evaluation, Teaching, PT/OT assessment  Whether pt should return home is questionable    I certify that she is currently homebound and can not leave home without the assistance of another person and putting forth a taxing effort.     F/u here 2-3 weeks

## 2021-03-30 ENCOUNTER — TELEPHONE (OUTPATIENT)
Dept: INTERNAL MEDICINE CLINIC | Age: 86
End: 2021-03-30

## 2021-03-30 ENCOUNTER — HOME CARE VISIT (OUTPATIENT)
Dept: SCHEDULING | Facility: HOME HEALTH | Age: 86
End: 2021-03-30

## 2021-03-30 NOTE — TELEPHONE ENCOUNTER
Called pt's caregiver, Alton Townsend and left message. Call back number left and  will attempt to contact again. The call was to inform of order for Echo and MRI Brain to be completed.

## 2021-04-01 ENCOUNTER — PATIENT OUTREACH (OUTPATIENT)
Dept: CASE MANAGEMENT | Age: 86
End: 2021-04-01

## 2021-04-12 ENCOUNTER — HOME HEALTH ADMISSION (OUTPATIENT)
Dept: HOME HEALTH SERVICES | Facility: HOME HEALTH | Age: 86
End: 2021-04-12
Payer: MEDICARE

## 2021-04-14 ENCOUNTER — HOME CARE VISIT (OUTPATIENT)
Dept: HOME HEALTH SERVICES | Facility: HOME HEALTH | Age: 86
End: 2021-04-14

## 2021-04-14 NOTE — PROGRESS NOTES
[facility]    Larry Mohr is a 80 y.o. female. : 8/15/1932   MRN: [id number]    Attending Physician: Declan Buckner MD  PCP: Alexandra Hatch MD    Transition of Care     Admit Date: 3/25/2021 D/C Date: 3/31/2021     Primary Discharge Diagnosis:   Syncope   [facility]  Course:  27-year-old female with history of hypertension, hyperlipidemia, glaucoma, comes with episode of syncope? She first felt lightheaded? When she woke up she was extremely confused and only responding with her name. Heidi Freitas is not a great historian? CT scan showed no acute findings but does show remote basal ganglia infarct? She denies any chest pain fever chills shortness of breath? Denies any complaints at this time? 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 Ct head reviewed- nothing acute- shows old cva  Cont Asa, statin, added plavix by neuro for 3 weeks, f/up as outpt   pvl carotids- negative   echo per stroke protocol No evidence of shunt at atrial level by 2D, color flow and negative bubble  study. Left ventricular systolic function is normal with an ejection fraction of 57 % by Torres's biplane.   consulted cardio for QUINCY but not needed, f/up cardiology as outpt , event monitor placed to r/o arrhythmia  EEG- no seizure  PT eval recs STONEY  urine culture: group B strep

## 2021-04-15 ENCOUNTER — PATIENT OUTREACH (OUTPATIENT)
Dept: CASE MANAGEMENT | Age: 86
End: 2021-04-15

## 2021-04-15 ENCOUNTER — OFFICE VISIT (OUTPATIENT)
Dept: INTERNAL MEDICINE CLINIC | Age: 86
End: 2021-04-15
Payer: MEDICARE

## 2021-04-15 VITALS
DIASTOLIC BLOOD PRESSURE: 89 MMHG | WEIGHT: 172 LBS | BODY MASS INDEX: 34.68 KG/M2 | SYSTOLIC BLOOD PRESSURE: 145 MMHG | TEMPERATURE: 97.5 F | HEART RATE: 74 BPM | OXYGEN SATURATION: 96 % | RESPIRATION RATE: 17 BRPM | HEIGHT: 59 IN

## 2021-04-15 DIAGNOSIS — R73.9 ELEVATED BLOOD SUGAR: ICD-10-CM

## 2021-04-15 DIAGNOSIS — R60.0 PEDAL EDEMA: ICD-10-CM

## 2021-04-15 DIAGNOSIS — I10 ESSENTIAL HYPERTENSION: ICD-10-CM

## 2021-04-15 DIAGNOSIS — Z09 HOSPITAL DISCHARGE FOLLOW-UP: Primary | ICD-10-CM

## 2021-04-15 PROCEDURE — G8510 SCR DEP NEG, NO PLAN REQD: HCPCS | Performed by: INTERNAL MEDICINE

## 2021-04-15 PROCEDURE — G8427 DOCREV CUR MEDS BY ELIG CLIN: HCPCS | Performed by: INTERNAL MEDICINE

## 2021-04-15 PROCEDURE — 1101F PT FALLS ASSESS-DOCD LE1/YR: CPT | Performed by: INTERNAL MEDICINE

## 2021-04-15 PROCEDURE — G8536 NO DOC ELDER MAL SCRN: HCPCS | Performed by: INTERNAL MEDICINE

## 2021-04-15 PROCEDURE — 99214 OFFICE O/P EST MOD 30 MIN: CPT | Performed by: INTERNAL MEDICINE

## 2021-04-15 PROCEDURE — G8417 CALC BMI ABV UP PARAM F/U: HCPCS | Performed by: INTERNAL MEDICINE

## 2021-04-15 PROCEDURE — 1090F PRES/ABSN URINE INCON ASSESS: CPT | Performed by: INTERNAL MEDICINE

## 2021-04-15 RX ORDER — AMLODIPINE BESYLATE 5 MG/1
5 TABLET ORAL DAILY
COMMUNITY
Start: 2021-04-01 | End: 2021-04-18

## 2021-04-15 RX ORDER — CLOPIDOGREL BISULFATE 75 MG/1
75 TABLET ORAL DAILY
COMMUNITY
Start: 2021-03-31 | End: 2022-01-18 | Stop reason: SDUPTHER

## 2021-04-15 RX ORDER — VALSARTAN 80 MG/1
80 TABLET ORAL DAILY
COMMUNITY
Start: 2021-04-01 | End: 2021-04-18

## 2021-04-15 RX ORDER — PANTOPRAZOLE SODIUM 20 MG/1
20 TABLET, DELAYED RELEASE ORAL DAILY
COMMUNITY
Start: 2021-04-01 | End: 2021-10-01

## 2021-04-15 RX ORDER — CARBOXYMETHYLCELLULOSE SODIUM 10 MG/ML
2 GEL OPHTHALMIC 4 TIMES DAILY
COMMUNITY
End: 2022-01-18

## 2021-04-15 RX ORDER — METOPROLOL SUCCINATE 25 MG/1
25 TABLET, EXTENDED RELEASE ORAL DAILY
COMMUNITY
Start: 2021-04-01 | End: 2021-10-01

## 2021-04-15 NOTE — PROGRESS NOTES
HISTORY OF PRESENT ILLNESS  Ihsan Rabago is a 80 y.o. female. BP (!) 145/89 (BP 1 Location: Left upper arm, BP Patient Position: Sitting, BP Cuff Size: Adult)   Pulse 74   Temp 97.5 °F (36.4 °C) (Temporal)   Resp 17   Ht 4' 11\" (1.499 m)   Wt 172 lb (78 kg)   SpO2 96%   BMI 34.74 kg/m²     Just got out SNF 2 days ago  She had been in [facility] 3/25-3/21/21  Then she went to SNF    There may have been a bladder infection  There may have been a subacute stroke noted on MRI that was not seen on CT scan. Thought embolic    Home Health was ordered by SNF. Still pending    She has appts with cardiology and neurology already scheduled in [facility] Follow Up  The history is provided by the patient (see comments--see other note as well. ). This is a new problem. Review of Systems   Constitutional: Negative for chills. Cardiovascular: Positive for leg swelling. Negative for palpitations. Neurological: Negative for dizziness and focal weakness. Physical Exam  Vitals signs and nursing note reviewed. Constitutional:       Appearance: Normal appearance. She is well-developed. Cardiovascular:      Rate and Rhythm: Normal rate and regular rhythm. Pulmonary:      Effort: Pulmonary effort is normal.      Breath sounds: Normal breath sounds. Musculoskeletal:      Right lower leg: Edema (1+) present. Left lower leg: Edema (1+) present. Skin:     General: Skin is warm and dry. Neurological:      General: No focal deficit present. Mental Status: She is alert and oriented to person, place, and time. Psychiatric:         Mood and Affect: Mood normal.         Behavior: Behavior normal.         ASSESSMENT and PLAN    ICD-10-CM ICD-9-CM    1. Hospital discharge follow-up  Z09 V67.59    2. Essential hypertension  I10 401.9    3. Elevated blood sugar  R73.9 790.29    4. Pedal edema  R60.0 782.3          Available hospital/ER record reviewed  Syncopal episode.  ? Subacute stroke noted    Her discharge info from SNF also reviewed. She is now on 3 BP meds. Will continue for now    BP acceptable today  Mild pedal edema. Avoid diuretics if possible    Using walker. Currently at her daughter's home. She wants to go home soon. Advised her PT will help determine if she is safe to be alone yet.     F/u 2 weeks for MWV and continued F/U

## 2021-04-15 NOTE — PROGRESS NOTES
Care Transitions Initial Follow Up Call    CTN Attempt to contact patient via telephone on 5/15/21 for  follow up. Unable to reach. Unable to leave a voice message. Pt. Phone number keeps on beeping , no answer. I appreciate Nurse Brittney Meza for letting CTN  knows that Patient was d/c from SNF two days ago and also for making CTN aware that Patient attended PCP appointment today 4/15/21.      [address] follow up appointment(s):   Future Appointments   Date Time Provider Ruba Paredes   4/16/2021 To Be Determined Candace Solitario [address] [facility]   5/5/2021  2:00 PM Nader Velez MD [facility]

## 2021-04-15 NOTE — PROGRESS NOTES
Reviewed record in preparation for visit and have obtained necessary documentation. Tabitha Mcmillan is a 80 y.o.  female presents today for office visit for No chief complaint on file. . Pt is not fasting. Patient is accompanied byher  daughter I have received verbal consent from Tabitha Mcmillan to discuss any/all medical information while they are present in the room. Pt is in Room # 4. Tabitha Mcmillan preferred language for health care discussion is english/other. Is the patient using any DME equipment during OV? YES - walker    Advance Directive:  1. Do you have an advance directive in place? Patient Reply: NO    2. If not, would you like material regarding how to put one in place? NO    Coordination of Care:  1. Have you been to the ER, urgent care clinic since your last visit? Hospitalized since your last visit? YES, [facility] 3/25/21 - 3/31/21 for Syncope and collapse    2. Have you seen or consulted any other health care providers outside of the [address] [place] [address] since your last visit? Include any pap smears or colon screening. [facility] 03/31/21 - 4/13/21    Upcoming Appts  No    VORB: No orders of the defined types were placed in this encounter.  Dayanara Siddiqui MD/Cate Cuello LPN    Tabitha Mcmillan is due for:   Health Maintenance Due   Topic    COVID-19 Vaccine (1)    Pneumococcal 65+ years (1 of 1 - PPSV23)    Medicare Yearly Exam      Health Maintenance reviewed and discussed per provider  Please order/place referral if appropriate.     Requested Prescriptions      No prescriptions requested or ordered in this encounter       Learning Assessment:  Learning Assessment 8/1/2016   PRIMARY LEARNER Patient   HIGHEST LEVEL OF EDUCATION - PRIMARY LEARNER  GRADUATED HIGH SCHOOL OR GED   PRIMARY LANGUAGE ENGLISH   LEARNER PREFERENCE PRIMARY DEMONSTRATION   ANSWERED BY MARGIE Robbins   RELATIONSHIP SELF     Depression Screening:  3 most recent PHQ Screens 3/25/2021 2/27/2020 11/26/2019 1/28/2019 5/29/2018 11/28/2017 9/28/2017   Little interest or pleasure in doing things Not at all Not at all Not at all Not at all Not at all Not at all Not at all   Feeling down, depressed, irritable, or hopeless Not at all Not at all Not at all Not at all Not at all Not at all Not at all   Total Score PHQ 2 0 0 0 0 0 0 0     Abuse Screening:  Abuse Screening Questionnaire 2/27/2020 11/28/2017 9/28/2017 8/1/2016   Do you ever feel afraid of your partner? N N N N   Are you in a relationship with someone who physically or mentally threatens you? N N N N   Is it safe for you to go home? Brie Bell     Fall Risk  Fall Risk Assessment, last 12 mths 3/25/2021 2/27/2020 11/26/2019 4/29/2019 1/28/2019 5/29/2018 1/29/2018   Able to walk? Yes Yes Yes Yes Yes Yes Yes   Fall in past 12 months? 1 No No No Yes No No   Number of falls in past 12 months 1 - - - 1 - -   Fall with injury?  1 - - - 0 - -     Recent Travel Screening and Travel History documentation     Travel Screening      No screening recorded since 04/14/21 0000      Travel History   Travel since 03/15/21     No documented travel since 03/15/21

## 2021-04-16 ENCOUNTER — HOME CARE VISIT (OUTPATIENT)
Dept: SCHEDULING | Facility: HOME HEALTH | Age: 86
End: 2021-04-16
Payer: MEDICARE

## 2021-04-16 ENCOUNTER — HOME CARE VISIT (OUTPATIENT)
Dept: HOME HEALTH SERVICES | Facility: HOME HEALTH | Age: 86
End: 2021-04-16

## 2021-04-16 PROCEDURE — 3331090002 HH PPS REVENUE DEBIT

## 2021-04-16 PROCEDURE — G0162 HHC RN E&M PLAN SVS, 15 MIN: HCPCS

## 2021-04-16 PROCEDURE — 3331090001 HH PPS REVENUE CREDIT

## 2021-04-16 PROCEDURE — 400013 HH SOC

## 2021-04-16 PROCEDURE — 400018 HH-NO PAY CLAIM PROCEDURE

## 2021-04-16 NOTE — Clinical Note
thank you   ----- Message -----  From: Kaitlin Bernal  Sent: 4/18/2021  11:54 AM EDT  To: Grace Mcfarlane OTR/RENA      Good morning,    Shraddha Robbins's SOC was completed on 4/16/21. I tried to offer Skilled Nursing visits for 2 times a week for 3 weeks  with 2 PRN but the pt/cg would only do 1 visit every Monday after 1pm  for 2 weeks with 2 PRN visits as needed. When viewing the medications it was noted that the pt has several BP medications and 3 different eye gtts. I'm not sure if you're aware (Dr. Zuly Don). The pt was told to take her Amlodipine Valsartan (Esforge) 5-320mg tablet and to remove from her medication box the 1. Amlodipine 5mg and  2. Valsartan (DIOVAN) 80 mg tablet. Which were two individual prescription bottles. P <100 or HR<60? As far as the eye gtts that's something you will have to reach out to the pt for just because I'm not sure why she was actually using the eye gtts other than the pt stating the she had an infection in her eyes when she was in rehab/hospital. Instructed the Cg to place the removed medications into a plastic ziploc bag so that the pt doesn't get confused when taking her medications.

## 2021-04-16 NOTE — Clinical Note
I will be seeing her today for a nursing visit. Do you want me to have them remove the medication because she had 2 bottles of Exforge and instruct to go back to using the 2 separate medications Amlodipine and Vaalsartain? Just want to make sure that I'm teaching her correctly. Also, what about the 3 different types of eye drops? Do she continue to use all 3?    ----- Message -----  From: Zbigniew York MD  Sent: 4/18/2021  10:45 PM EDT  To: Ricardo Lane      Thank you. The hospital and SNF probably did not carry her Exforge--it is more expensive than the separate ingredients. I have removed the amlodipine and valsartan from her list of meds   ----- Message -----  From: Iris Dan  Sent: 4/18/2021  11:54 AM EDT  To: Talib Li MD    Good morning,    Shraddha Robbins's SOC was completed on 4/16/21. I tried to offer Skilled Nursing visits for 2 times a week for 3 weeks  with 2 PRN but the pt/cg would only do 1 visit every Monday after 1pm  for 2 weeks with 2 PRN visits as needed. When viewing the medications it was noted that the pt has several BP medications and 3 different eye gtts. I'm not sure if you're aware (Dr. Christopher Shukla). The pt was told to take her Amlodipine Valsartan (Esforge) 5-320mg tablet and to remove from her medication box the 1. Amlodipine 5mg and  2. Valsartan (DIOVAN) 80 mg tablet. Which were two individual prescription bottles. P <100 or HR<60? As far as the eye gtts that's something you will have to reach out to the pt for just because I'm not sure why she was actually using the eye gtts other than the pt stating the she had an infection in her eyes when she was in rehab/hospital. Instructed the Cg to place the removed medications into a plastic ziploc bag so that the pt doesn't get confused when taking her medications.

## 2021-04-17 PROCEDURE — 3331090002 HH PPS REVENUE DEBIT

## 2021-04-17 PROCEDURE — 3331090001 HH PPS REVENUE CREDIT

## 2021-04-18 VITALS
HEART RATE: 71 BPM | OXYGEN SATURATION: 99 % | DIASTOLIC BLOOD PRESSURE: 68 MMHG | SYSTOLIC BLOOD PRESSURE: 128 MMHG | TEMPERATURE: 98.8 F | RESPIRATION RATE: 16 BRPM

## 2021-04-18 PROCEDURE — 3331090001 HH PPS REVENUE CREDIT

## 2021-04-18 PROCEDURE — 3331090002 HH PPS REVENUE DEBIT

## 2021-04-18 NOTE — PROGRESS NOTES
Good morning,    Shraddha Robbins's SOC was completed on 4/16/21. I tried to offer Skilled Nursing visits for 2 times a week for 3 weeks  with 2 PRN but the pt/cg would only do 1 visit every Monday after 1pm  for 2 weeks with 2 PRN visits as needed. When viewing the medications it was noted that the pt has several BP medications and 3 different eye gtts. I'm not sure if you're aware (Dr. Cecilia Manzo). The pt was told to take her Amlodipine Valsartan (Esforge) 5-320mg tablet and to remove from her medication box the 1. Amlodipine 5mg and  2. Valsartan (DIOVAN) 80 mg tablet. Which were two individual prescription bottles. P <100 or HR<60? As far as the eye gtts that's something you will have to reach out to the pt for just because I'm not sure why she was actually using the eye gtts other than the pt stating the she had an infection in her eyes when she was in rehab/hospital. Instructed the Cg to place the removed medications into a plastic ziploc bag so that the pt doesn't get confused when taking her medications.

## 2021-04-19 ENCOUNTER — HOME CARE VISIT (OUTPATIENT)
Dept: SCHEDULING | Facility: HOME HEALTH | Age: 86
End: 2021-04-19
Payer: MEDICARE

## 2021-04-19 ENCOUNTER — HOME CARE VISIT (OUTPATIENT)
Dept: HOME HEALTH SERVICES | Facility: HOME HEALTH | Age: 86
End: 2021-04-19
Payer: MEDICARE

## 2021-04-19 PROCEDURE — 3331090001 HH PPS REVENUE CREDIT

## 2021-04-19 PROCEDURE — G0152 HHCP-SERV OF OT,EA 15 MIN: HCPCS

## 2021-04-19 PROCEDURE — 3331090002 HH PPS REVENUE DEBIT

## 2021-04-19 PROCEDURE — G0299 HHS/HOSPICE OF RN EA 15 MIN: HCPCS

## 2021-04-20 ENCOUNTER — HOME CARE VISIT (OUTPATIENT)
Dept: SCHEDULING | Facility: HOME HEALTH | Age: 86
End: 2021-04-20
Payer: MEDICARE

## 2021-04-20 VITALS
SYSTOLIC BLOOD PRESSURE: 140 MMHG | TEMPERATURE: 97.6 F | OXYGEN SATURATION: 97 % | HEART RATE: 81 BPM | DIASTOLIC BLOOD PRESSURE: 82 MMHG

## 2021-04-20 VITALS
HEART RATE: 76 BPM | OXYGEN SATURATION: 98 % | TEMPERATURE: 98.1 F | DIASTOLIC BLOOD PRESSURE: 90 MMHG | SYSTOLIC BLOOD PRESSURE: 163 MMHG | RESPIRATION RATE: 16 BRPM

## 2021-04-20 PROCEDURE — 3331090002 HH PPS REVENUE DEBIT

## 2021-04-20 PROCEDURE — G0151 HHCP-SERV OF PT,EA 15 MIN: HCPCS

## 2021-04-20 PROCEDURE — 3331090001 HH PPS REVENUE CREDIT

## 2021-04-21 VITALS
TEMPERATURE: 97.3 F | DIASTOLIC BLOOD PRESSURE: 68 MMHG | RESPIRATION RATE: 18 BRPM | HEART RATE: 78 BPM | SYSTOLIC BLOOD PRESSURE: 140 MMHG | OXYGEN SATURATION: 99 %

## 2021-04-21 PROCEDURE — 3331090001 HH PPS REVENUE CREDIT

## 2021-04-21 PROCEDURE — 3331090002 HH PPS REVENUE DEBIT

## 2021-04-22 PROCEDURE — 3331090001 HH PPS REVENUE CREDIT

## 2021-04-22 PROCEDURE — 3331090002 HH PPS REVENUE DEBIT

## 2021-04-23 ENCOUNTER — HOME CARE VISIT (OUTPATIENT)
Dept: SCHEDULING | Facility: HOME HEALTH | Age: 86
End: 2021-04-23
Payer: MEDICARE

## 2021-04-23 VITALS
SYSTOLIC BLOOD PRESSURE: 140 MMHG | TEMPERATURE: 96.7 F | DIASTOLIC BLOOD PRESSURE: 68 MMHG | OXYGEN SATURATION: 98 % | HEART RATE: 67 BPM

## 2021-04-23 PROCEDURE — 3331090001 HH PPS REVENUE CREDIT

## 2021-04-23 PROCEDURE — G0157 HHC PT ASSISTANT EA 15: HCPCS

## 2021-04-23 PROCEDURE — 3331090002 HH PPS REVENUE DEBIT

## 2021-04-24 PROCEDURE — 3331090001 HH PPS REVENUE CREDIT

## 2021-04-24 PROCEDURE — 3331090002 HH PPS REVENUE DEBIT

## 2021-04-25 PROCEDURE — 3331090001 HH PPS REVENUE CREDIT

## 2021-04-25 PROCEDURE — 3331090002 HH PPS REVENUE DEBIT

## 2021-04-26 PROCEDURE — 3331090002 HH PPS REVENUE DEBIT

## 2021-04-26 PROCEDURE — 3331090001 HH PPS REVENUE CREDIT

## 2021-04-27 ENCOUNTER — HOME CARE VISIT (OUTPATIENT)
Dept: SCHEDULING | Facility: HOME HEALTH | Age: 86
End: 2021-04-27
Payer: MEDICARE

## 2021-04-27 PROCEDURE — G0157 HHC PT ASSISTANT EA 15: HCPCS

## 2021-04-27 PROCEDURE — 3331090002 HH PPS REVENUE DEBIT

## 2021-04-27 PROCEDURE — 3331090001 HH PPS REVENUE CREDIT

## 2021-04-28 VITALS
TEMPERATURE: 97.3 F | SYSTOLIC BLOOD PRESSURE: 120 MMHG | DIASTOLIC BLOOD PRESSURE: 74 MMHG | OXYGEN SATURATION: 92 % | HEART RATE: 63 BPM

## 2021-04-28 PROCEDURE — 3331090002 HH PPS REVENUE DEBIT

## 2021-04-28 PROCEDURE — 3331090001 HH PPS REVENUE CREDIT

## 2021-04-29 ENCOUNTER — HOME CARE VISIT (OUTPATIENT)
Dept: SCHEDULING | Facility: HOME HEALTH | Age: 86
End: 2021-04-29
Payer: MEDICARE

## 2021-04-29 PROCEDURE — 3331090001 HH PPS REVENUE CREDIT

## 2021-04-29 PROCEDURE — 3331090002 HH PPS REVENUE DEBIT

## 2021-04-29 PROCEDURE — G0157 HHC PT ASSISTANT EA 15: HCPCS

## 2021-04-29 PROCEDURE — G0299 HHS/HOSPICE OF RN EA 15 MIN: HCPCS

## 2021-04-30 VITALS
SYSTOLIC BLOOD PRESSURE: 138 MMHG | HEART RATE: 61 BPM | OXYGEN SATURATION: 96 % | TEMPERATURE: 98.1 F | DIASTOLIC BLOOD PRESSURE: 70 MMHG

## 2021-04-30 PROCEDURE — 3331090002 HH PPS REVENUE DEBIT

## 2021-04-30 PROCEDURE — 3331090001 HH PPS REVENUE CREDIT

## 2021-05-01 PROCEDURE — 3331090001 HH PPS REVENUE CREDIT

## 2021-05-01 PROCEDURE — 3331090002 HH PPS REVENUE DEBIT

## 2021-05-02 VITALS
TEMPERATURE: 97.3 F | DIASTOLIC BLOOD PRESSURE: 92 MMHG | HEART RATE: 75 BPM | OXYGEN SATURATION: 97 % | SYSTOLIC BLOOD PRESSURE: 144 MMHG | RESPIRATION RATE: 18 BRPM

## 2021-05-02 PROCEDURE — 3331090002 HH PPS REVENUE DEBIT

## 2021-05-02 PROCEDURE — 3331090001 HH PPS REVENUE CREDIT

## 2021-05-03 PROCEDURE — 3331090001 HH PPS REVENUE CREDIT

## 2021-05-03 PROCEDURE — 3331090002 HH PPS REVENUE DEBIT

## 2021-05-04 ENCOUNTER — HOME CARE VISIT (OUTPATIENT)
Dept: SCHEDULING | Facility: HOME HEALTH | Age: 86
End: 2021-05-04
Payer: MEDICARE

## 2021-05-04 VITALS
OXYGEN SATURATION: 99 % | DIASTOLIC BLOOD PRESSURE: 68 MMHG | HEART RATE: 58 BPM | SYSTOLIC BLOOD PRESSURE: 122 MMHG | TEMPERATURE: 97.5 F

## 2021-05-04 PROCEDURE — 3331090001 HH PPS REVENUE CREDIT

## 2021-05-04 PROCEDURE — 3331090002 HH PPS REVENUE DEBIT

## 2021-05-04 PROCEDURE — G0157 HHC PT ASSISTANT EA 15: HCPCS

## 2021-05-05 ENCOUNTER — OFFICE VISIT (OUTPATIENT)
Dept: INTERNAL MEDICINE CLINIC | Age: 86
End: 2021-05-05
Payer: MEDICARE

## 2021-05-05 ENCOUNTER — PATIENT OUTREACH (OUTPATIENT)
Dept: CASE MANAGEMENT | Age: 86
End: 2021-05-05

## 2021-05-05 VITALS
HEIGHT: 59 IN | SYSTOLIC BLOOD PRESSURE: 137 MMHG | WEIGHT: 172 LBS | TEMPERATURE: 97.3 F | DIASTOLIC BLOOD PRESSURE: 59 MMHG | RESPIRATION RATE: 17 BRPM | HEART RATE: 71 BPM | OXYGEN SATURATION: 98 % | BODY MASS INDEX: 34.68 KG/M2

## 2021-05-05 DIAGNOSIS — Z00.00 MEDICARE ANNUAL WELLNESS VISIT, SUBSEQUENT: Primary | ICD-10-CM

## 2021-05-05 PROCEDURE — 1101F PT FALLS ASSESS-DOCD LE1/YR: CPT | Performed by: INTERNAL MEDICINE

## 2021-05-05 PROCEDURE — G0439 PPPS, SUBSEQ VISIT: HCPCS | Performed by: INTERNAL MEDICINE

## 2021-05-05 PROCEDURE — G8427 DOCREV CUR MEDS BY ELIG CLIN: HCPCS | Performed by: INTERNAL MEDICINE

## 2021-05-05 PROCEDURE — G8417 CALC BMI ABV UP PARAM F/U: HCPCS | Performed by: INTERNAL MEDICINE

## 2021-05-05 PROCEDURE — G8510 SCR DEP NEG, NO PLAN REQD: HCPCS | Performed by: INTERNAL MEDICINE

## 2021-05-05 PROCEDURE — G8536 NO DOC ELDER MAL SCRN: HCPCS | Performed by: INTERNAL MEDICINE

## 2021-05-05 PROCEDURE — 3331090002 HH PPS REVENUE DEBIT

## 2021-05-05 PROCEDURE — 3331090001 HH PPS REVENUE CREDIT

## 2021-05-05 NOTE — PROGRESS NOTES
Patient has graduated from the Transitions of Care Coordination  program on 5/5/21. CTN Attempt to contact patient via telephone on 5/5/21 for follow up. Unable to reach. Unable to leave a voice message, Pt. Phone number is ringing busy. Sent Message to Nurse Lopez Johnston nurse requesting assistance on giving Perm/PHI paper work for Pt. To fill out. I appreciate Nurse Lopez Lovett for assistance. Noted Patient attended PCP appointment today. Patient's upcoming visits:    Future Appointments   Date Time Provider Ruba Paredes   5/5/2021  2:00 PM Rhina Adrian MD [facility]   5/6/2021  1:30 PM Art Simon PTA [address] [place] [address]   5/10/2021 To Be Determined Art Simon PTA [address] [place] [address]   5/12/2021 To Be Determined Tosin Santos, PT [facility]        I have been unable to reach Pt. On phone. This episode is closed and resolved.

## 2021-05-05 NOTE — PROGRESS NOTES
Reviewed record in preparation for visit and have obtained necessary documentation. Joshua Torres is a 80 y.o.  female presents today for office visit for   Chief Complaint   Patient presents with   [facility] Annual Wellness Visit   . Pt is not fasting. Patient is accompanied by daughter. I have received verbal consent from Joshua Torres to discuss any/all medical information while they are present in the room. Pt is in Room # 22113 [facility] [address] preferred language for health care discussion is english/other. Is the patient using any DME equipment during OV? YES    Advance Directive:  1. Do you have an advance directive in place? Patient Reply: NO    2. If not, would you like material regarding how to put one in place? NO    Coordination of Care:  1. Have you been to the ER, urgent care clinic since your last visit? Hospitalized since your last visit? NO    2. Have you seen or consulted any other health care providers outside of the [address] [place] [address] since your last visit? Include any pap smears or colon screening. NO    Upcoming Appts  No    VORB: No orders of the defined types were placed in this encounter.  Sandy Smith MD/Cate Navarrete LPN    Joshua Torres is due for:   Health Maintenance Due   Topic    COVID-19 Vaccine (1)    Pneumococcal 65+ years (1 of 1 - PPSV23)    Medicare Yearly Exam      Health Maintenance reviewed and discussed per provider  Please order/place referral if appropriate.     Requested Prescriptions      No prescriptions requested or ordered in this encounter       Learning Assessment:  Learning Assessment 8/1/2016   PRIMARY LEARNER Patient   HIGHEST LEVEL OF EDUCATION - PRIMARY LEARNER  GRADUATED HIGH SCHOOL OR GED   PRIMARY LANGUAGE ENGLISH   LEARNER PREFERENCE PRIMARY DEMONSTRATION   ANSWERED BY MARGIE Robbins   RELATIONSHIP SELF     Depression Screening:  3 most recent [facility] Screens 5/5/2021 4/15/2021 3/25/2021 2/27/2020 11/26/2019 1/28/2019 5/29/2018 Little interest or pleasure in doing things Not at all Not at all Not at all Not at all Not at all Not at all Not at all   Feeling down, depressed, irritable, or hopeless Not at all Not at all Not at all Not at all Not at all Not at all Not at all   Total Score PHQ 2 0 0 0 0 0 0 0     Abuse Screening:  Abuse Screening Questionnaire 5/5/2021 2/27/2020 11/28/2017 9/28/2017 8/1/2016   Do you ever feel afraid of your partner? N N N N N   Are you in a relationship with someone who physically or mentally threatens you? N N N N N   Is it safe for you to go home? Carole DELGADO     Fall Risk  Fall Risk Assessment, last 12 mths 5/5/2021 4/15/2021 3/25/2021 2/27/2020 11/26/2019 4/29/2019 1/28/2019   Able to walk? Yes Yes Yes Yes Yes Yes Yes   Fall in past 12 months? 1 1 1 No No No Yes   Do you feel unsteady? 0 0 - - - - -   Are you worried about falling 0 1 - - - - -   Is TUG test greater than 12 seconds? 0 0 - - - - -   Is the gait abnormal? 0 1 - - - - -   Number of falls in past 12 months 2 2 1 - - - 1   Fall with injury?  1 1 1 - - - 0     Recent Travel Screening and Travel History documentation     Travel Screening      No screening recorded since 05/04/21 0000      Travel History   Travel since 04/05/21     No documented travel since 04/05/21

## 2021-05-05 NOTE — PATIENT INSTRUCTIONS

## 2021-05-05 NOTE — PROGRESS NOTES
This is the Subsequent Medicare Annual Wellness Exam, performed 12 months or more after the Initial AWV or the last Subsequent AWV    I have reviewed the patient's medical history in detail and updated the computerized patient record. BP (!) 137/59   Pulse 71   Temp 97.3 °F (36.3 °C) (Temporal)   Resp 17   Ht 4' 11\" (1.499 m)   Wt 172 lb (78 kg)   SpO2 98%   BMI 34.74 kg/m²       Assessment/Plan   Education and counseling provided:  Are appropriate based on today's review and evaluation    1. Medicare annual wellness visit, subsequent     Doing well today  To go back to her home today. PT cleared her to go and will still come out for another week  She is still using a walker    Depression Risk Factor Screening     3 most recent PHQ Screens 5/5/2021   Little interest or pleasure in doing things Not at all   Feeling down, depressed, irritable, or hopeless Not at all   Total Score PHQ 2 0       Alcohol Risk Screen    Do you average more than 1 drink per night or more than 7 drinks a week:  No    On any one occasion in the past three months have you have had more than 3 drinks containing alcohol:  No        Functional Ability and Level of Safety    Hearing: Hearing is good. Activities of Daily Living: The home contains: no safety equipment. Patient needs help with:  transportation and walking  But she is getting better. Ambulation: with difficulty, uses a walker     Fall Risk:  Fall Risk Assessment, last 12 mths 5/5/2021   Able to walk? Yes   Fall in past 12 months? 1   Do you feel unsteady? 0   Are you worried about falling 0   Is TUG test greater than 12 seconds? 0   Is the gait abnormal? 0   Number of falls in past 12 months 2   Fall with injury? 1      Abuse Screen:  Patient is not abused       Cognitive Screening    Has your family/caregiver stated any concerns about your memory: yes - family has noted     Cognitive Screening: Abnormal - Animal Naming Test This year she got 9 animals.     Health Maintenance Due     Health Maintenance Due   Topic Date Due    COVID-19 Vaccine (1) Never done    Pneumococcal 65+ years (1 of 1 - PPSV23) Never done       Patient Care Team   Patient Care Team:  Rody Jordan MD as PCP - General (Internal Medicine)  Rody Jordan MD [facility] PCP [facility]  Mary Su MD as Consulting Provider (Ophthalmology)  Irasema Block MD as Consulting Provider (Gastroenterology)    History     Patient Active Problem List   Diagnosis Code    Hypercholesteremia E78.00    Essential hypertension I10    Severe obesity (BMI 35.0-39. 9) with comorbidity ([facility] [address] E66.01     Past Medical History:   Diagnosis Date    Gait, antalgic     Glaucoma     Hx of cataract     Hypercholesteremia     Hypertension 1's    MCI (mild cognitive impairment) 02/27/2020    ???; Abnormal Cognitive Screen by PCP on 2/27/2020      Past Surgical History:   Procedure Laterality Date    HX CATARACT REMOVAL Bilateral 2014    HX COLONOSCOPY  12/01/2010    Dr. Peterson Code. Raegan    HX HYSTERECTOMY       Current Outpatient Medications   Medication Sig Dispense Refill    atorvastatin (LIPITOR) 40 mg tablet Take 40 mg by mouth daily. Give 1 tablet by mouthat bedtime for antihyperlipidemic    pr.qamar bui      metoprolol succinate (TOPROL-XL) 25 mg XL tablet Take 25 mg by mouth daily. HOLD for SBP <100 or HR <60      clopidogreL (Plavix) 75 mg tab Take 75 mg by mouth daily.  carboxymethylcellulose sodium (Refresh Liquigel) 1 % dlgl ophthalmic solution Administer 2 Drops to both eyes four (4) times daily.  amLODIPine-valsartan (EXFORGE) 5-320 mg per tablet TAKE 1 TABLET BY MOUTH EVERY DAY 90 Tab 5    bimatoprost (LUMIGAN) 0.03 % ophthalmic drops Instill 1 Drop in affected eye(s) Every Night at Bedtime.  loteprednol etabonate (LOTEMAX) 0.5 % ophthalmic suspension Instill 1 Drop in affected eye(s) Once a Day.       fish oil-omega-3 fatty acids 340-1,000 mg capsule 1,000 mg.      multivitamin (ONE A DAY) tablet Take 1 Tab by mouth daily. Take  by Mouth Once a Day.  prednisoLONE acetate (PRED FORTE) 1 % ophthalmic suspension       aspirin delayed-release 81 mg tablet Take 81 mg by mouth daily.  omega-3 fatty acids 1,000 mg cap 1,000 mg by Mouth/Throat route daily.  pantoprazole (Protonix) 20 mg tablet Take 20 mg by mouth daily.  omega-3 fatty acids-vitamin e 1,000 mg cap 1,000 mg.        Allergies   Allergen Reactions    Penicillins Other (comments)     Passed out      Statins-Hmg-Coa Reductase Inhibitors Myalgia     Tried multiple statins       Family History   Problem Relation Age of Onset    Stroke Mother 61    Other Father         fire at job    Cancer Brother     Other Brother         blood clots    Cancer Daughter      Social History     Tobacco Use    Smoking status: Former Smoker     Packs/day: 0.25     Years: 5.00     Pack years: 1.25     Types: Cigarettes    Smokeless tobacco: Never Used    Tobacco comment: a pack lasted several months   Substance Use Topics    Alcohol use: No     Alcohol/week: 0.0 standard drinks         Thanh Callejas MD

## 2021-05-06 ENCOUNTER — HOME CARE VISIT (OUTPATIENT)
Dept: SCHEDULING | Facility: HOME HEALTH | Age: 86
End: 2021-05-06
Payer: MEDICARE

## 2021-05-06 PROCEDURE — G0157 HHC PT ASSISTANT EA 15: HCPCS

## 2021-05-06 PROCEDURE — 3331090002 HH PPS REVENUE DEBIT

## 2021-05-06 PROCEDURE — 3331090001 HH PPS REVENUE CREDIT

## 2021-05-07 VITALS
SYSTOLIC BLOOD PRESSURE: 134 MMHG | OXYGEN SATURATION: 96 % | DIASTOLIC BLOOD PRESSURE: 80 MMHG | HEART RATE: 68 BPM | TEMPERATURE: 97.1 F

## 2021-05-07 PROCEDURE — 3331090002 HH PPS REVENUE DEBIT

## 2021-05-07 PROCEDURE — 3331090001 HH PPS REVENUE CREDIT

## 2021-05-08 PROCEDURE — 3331090002 HH PPS REVENUE DEBIT

## 2021-05-08 PROCEDURE — 3331090001 HH PPS REVENUE CREDIT

## 2021-05-09 PROCEDURE — 3331090001 HH PPS REVENUE CREDIT

## 2021-05-09 PROCEDURE — 3331090002 HH PPS REVENUE DEBIT

## 2021-05-10 PROCEDURE — 3331090001 HH PPS REVENUE CREDIT

## 2021-05-10 PROCEDURE — 3331090002 HH PPS REVENUE DEBIT

## 2021-05-11 ENCOUNTER — HOME CARE VISIT (OUTPATIENT)
Dept: SCHEDULING | Facility: HOME HEALTH | Age: 86
End: 2021-05-11
Payer: MEDICARE

## 2021-05-11 PROCEDURE — 3331090001 HH PPS REVENUE CREDIT

## 2021-05-11 PROCEDURE — G0157 HHC PT ASSISTANT EA 15: HCPCS

## 2021-05-11 PROCEDURE — 3331090002 HH PPS REVENUE DEBIT

## 2021-05-12 ENCOUNTER — HOME CARE VISIT (OUTPATIENT)
Dept: SCHEDULING | Facility: HOME HEALTH | Age: 86
End: 2021-05-12
Payer: MEDICARE

## 2021-05-12 VITALS — DIASTOLIC BLOOD PRESSURE: 76 MMHG | HEART RATE: 83 BPM | TEMPERATURE: 96.1 F | SYSTOLIC BLOOD PRESSURE: 116 MMHG

## 2021-05-12 VITALS
OXYGEN SATURATION: 99 % | TEMPERATURE: 97.2 F | SYSTOLIC BLOOD PRESSURE: 130 MMHG | HEART RATE: 62 BPM | DIASTOLIC BLOOD PRESSURE: 76 MMHG

## 2021-05-12 PROCEDURE — 3331090002 HH PPS REVENUE DEBIT

## 2021-05-12 PROCEDURE — G0151 HHCP-SERV OF PT,EA 15 MIN: HCPCS

## 2021-05-12 PROCEDURE — 3331090001 HH PPS REVENUE CREDIT

## 2021-09-03 ENCOUNTER — HOSPITAL ENCOUNTER (OUTPATIENT)
Dept: LAB | Age: 86
Discharge: HOME | End: 2021-09-03
Payer: MEDICARE

## 2021-09-03 ENCOUNTER — OFFICE VISIT (OUTPATIENT)
Dept: INTERNAL MEDICINE CLINIC | Age: 86
End: 2021-09-03
Payer: MEDICARE

## 2021-09-03 VITALS
HEIGHT: 59 IN | DIASTOLIC BLOOD PRESSURE: 67 MMHG | HEART RATE: 56 BPM | SYSTOLIC BLOOD PRESSURE: 175 MMHG | WEIGHT: 172 LBS | RESPIRATION RATE: 18 BRPM | TEMPERATURE: 97.1 F | OXYGEN SATURATION: 98 % | BODY MASS INDEX: 34.68 KG/M2

## 2021-09-03 DIAGNOSIS — I10 ESSENTIAL HYPERTENSION: Primary | ICD-10-CM

## 2021-09-03 DIAGNOSIS — I10 ESSENTIAL HYPERTENSION: ICD-10-CM

## 2021-09-03 DIAGNOSIS — E78.00 HYPERCHOLESTEREMIA: ICD-10-CM

## 2021-09-03 LAB
ALBUMIN SERPL-MCNC: 3.7 G/DL (ref 3.4–5)
ALBUMIN/GLOB SERPL: 0.9 {RATIO} (ref 0.8–1.7)
ALP SERPL-CCNC: 76 U/L (ref 45–117)
ALT SERPL-CCNC: 17 U/L (ref 13–56)
ANION GAP SERPL CALC-SCNC: 4 MMOL/L (ref 3–18)
AST SERPL-CCNC: 14 U/L (ref 10–38)
BILIRUB SERPL-MCNC: 0.7 MG/DL (ref 0.2–1)
BUN SERPL-MCNC: 13 MG/DL (ref 7–18)
BUN/CREAT SERPL: 16 (ref 12–20)
CALCIUM SERPL-MCNC: 9.1 MG/DL (ref 8.5–10.1)
CHLORIDE SERPL-SCNC: 110 MMOL/L (ref 100–111)
CHOLEST SERPL-MCNC: 279 MG/DL
CO2 SERPL-SCNC: 28 MMOL/L (ref 21–32)
CREAT SERPL-MCNC: 0.79 MG/DL (ref 0.6–1.3)
GLOBULIN SER CALC-MCNC: 3.9 G/DL (ref 2–4)
GLUCOSE SERPL-MCNC: 87 MG/DL (ref 74–99)
HDLC SERPL-MCNC: 53 MG/DL (ref 40–60)
HDLC SERPL: 5.3 {RATIO} (ref 0–5)
LDLC SERPL CALC-MCNC: 208.8 MG/DL (ref 0–100)
LIPID PROFILE,FLP: ABNORMAL
POTASSIUM SERPL-SCNC: 4.3 MMOL/L (ref 3.5–5.5)
PROT SERPL-MCNC: 7.6 G/DL (ref 6.4–8.2)
SODIUM SERPL-SCNC: 142 MMOL/L (ref 136–145)
TRIGL SERPL-MCNC: 86 MG/DL (ref ?–150)
VLDLC SERPL CALC-MCNC: 17.2 MG/DL

## 2021-09-03 PROCEDURE — 1090F PRES/ABSN URINE INCON ASSESS: CPT | Performed by: INTERNAL MEDICINE

## 2021-09-03 PROCEDURE — 1101F PT FALLS ASSESS-DOCD LE1/YR: CPT | Performed by: INTERNAL MEDICINE

## 2021-09-03 PROCEDURE — G8417 CALC BMI ABV UP PARAM F/U: HCPCS | Performed by: INTERNAL MEDICINE

## 2021-09-03 PROCEDURE — 99214 OFFICE O/P EST MOD 30 MIN: CPT | Performed by: INTERNAL MEDICINE

## 2021-09-03 PROCEDURE — 36415 COLL VENOUS BLD VENIPUNCTURE: CPT

## 2021-09-03 PROCEDURE — G8536 NO DOC ELDER MAL SCRN: HCPCS | Performed by: INTERNAL MEDICINE

## 2021-09-03 PROCEDURE — 80061 LIPID PANEL: CPT

## 2021-09-03 PROCEDURE — G8427 DOCREV CUR MEDS BY ELIG CLIN: HCPCS | Performed by: INTERNAL MEDICINE

## 2021-09-03 PROCEDURE — G8432 DEP SCR NOT DOC, RNG: HCPCS | Performed by: INTERNAL MEDICINE

## 2021-09-03 PROCEDURE — 80053 COMPREHEN METABOLIC PANEL: CPT

## 2021-09-03 NOTE — PROGRESS NOTES
Reviewed record in preparation for visit and have obtained necessary documentation. Kathy Perdomo is a 80 y.o.  female presents today for office visit for   Chief Complaint   Patient presents with    Hypertension    Cholesterol Problem   . Pt is not fasting. Patient is accompanied by her daughter. I have received verbal consent from Kathy Perdomo to discuss any/all medical information while they are present in the room. Pt is in Room # 16422 [facility] [address] preferred language for health care discussion is english/other. Is the patient using any DME equipment during OV? YES    Advance Directive:  1. Do you have an advance directive in place? Patient Reply: NO    2. If not, would you like material regarding how to put one in place? NO    Coordination of Care:  1. Have you been to the ER, urgent care clinic since your last visit? Hospitalized since your last visit? NO    2. Have you seen or consulted any other health care providers outside of the [address] [place] [address] since your last visit? Include any pap smears or colon screening. YES, Dr. Fahad Lawler, Cardiology 8/2021    Upcoming Appts  Cardiology 11/2021    VORB: No orders of the defined types were placed in this encounter.  Bud Moody MD/Cate Tan , PHILIPPE    Kathy Perdomo is due for:   Health Maintenance Due   Topic    COVID-19 Vaccine (1)    Pneumococcal 65+ years (1 of 1 - PPSV23)    Flu Vaccine (1)     Health Maintenance reviewed and discussed per provider  Please order/place referral if appropriate.     Requested Prescriptions      No prescriptions requested or ordered in this encounter       Learning Assessment:  Learning Assessment 8/1/2016   PRIMARY LEARNER Patient   HIGHEST LEVEL OF EDUCATION - PRIMARY LEARNER  GRADUATED HIGH SCHOOL OR GED   PRIMARY LANGUAGE ENGLISH   LEARNER PREFERENCE PRIMARY DEMONSTRATION   ANSWERED BY MARGIE Robbins   RELATIONSHIP SELF     Depression Screening:  3 most recent PHQ Screens 5/5/2021 4/15/2021 3/25/2021 2/27/2020 11/26/2019 1/28/2019 5/29/2018   Little interest or pleasure in doing things Not at all Not at all Not at all Not at all Not at all Not at all Not at all   Feeling down, depressed, irritable, or hopeless Not at all Not at all Not at all Not at all Not at all Not at all Not at all   Total Score PHQ 2 0 0 0 0 0 0 0     Abuse Screening:  Abuse Screening Questionnaire 5/5/2021 2/27/2020 11/28/2017 9/28/2017 8/1/2016   Do you ever feel afraid of your partner? N N N N N   Are you in a relationship with someone who physically or mentally threatens you? N N N N N   Is it safe for you to go home? Michael DELGADO     Fall Risk  Fall Risk Assessment, last 12 mths 5/5/2021 4/15/2021 3/25/2021 2/27/2020 11/26/2019 4/29/2019 1/28/2019   Able to walk? Yes Yes Yes Yes Yes Yes Yes   Fall in past 12 months? 1 1 1 No No No Yes   Do you feel unsteady? 0 0 - - - - -   Are you worried about falling 0 1 - - - - -   Is TUG test greater than 12 seconds? 0 0 - - - - -   Is the gait abnormal? 0 1 - - - - -   Number of falls in past 12 months 2 2 1 - - - 1   Fall with injury?  1 1 1 - - - 0     Recent Travel Screening and Travel History documentation     Travel Screening      No screening recorded since 09/02/21 0000      Travel History   Travel since 08/03/21     No documented travel since 08/03/21

## 2021-09-03 NOTE — PROGRESS NOTES
HISTORY OF PRESENT ILLNESS  Jenny Granados is a 80 y.o. female. BP (!) 175/67 (BP 1 Location: Right arm, BP Patient Position: Sitting, BP Cuff Size: Large adult)   Pulse (!) 56   Temp 97.1 °F (36.2 °C) (Temporal)   Resp 18   Ht 4' 11\" (1.499 m)   Wt 172 lb (78 kg)   SpO2 98%   BMI 34.74 kg/m²     Hypertension   The history is provided by the patient. This is a chronic problem. The current episode started more than 1 week ago. Pertinent negatives include no chest pain, no palpitations, no headaches, no dizziness and no shortness of breath. There are no associated agents to hypertension. Risk factors include obesity, a sedentary lifestyle, postmenopause and dyslipidemia. Cholesterol Problem  The history is provided by the patient. This is a chronic problem. The problem occurs daily. Pertinent negatives include no chest pain, no headaches and no shortness of breath. Review of Systems   Constitutional: Negative. Respiratory: Negative for shortness of breath. Cardiovascular: Negative for chest pain and palpitations. Gastrointestinal:        Good appetite  Bowels \"good\"   Musculoskeletal: Negative for joint pain. Neurological: Negative for dizziness and headaches. Physical Exam  Vitals and nursing note reviewed. Constitutional:       Appearance: Normal appearance. She is well-developed. HENT:      Head: Normocephalic and atraumatic. Cardiovascular:      Rate and Rhythm: Normal rate and regular rhythm. Pulmonary:      Effort: Pulmonary effort is normal.      Breath sounds: Normal breath sounds. Musculoskeletal:      Right lower leg: Edema (trace) present. Left lower leg: Edema (trace) present. Comments: atalgic gait, limp. Using cane today   Skin:     General: Skin is warm and dry. Neurological:      General: No focal deficit present. Mental Status: She is alert and oriented to person, place, and time.    Psychiatric:         Mood and Affect: Mood normal. Behavior: Behavior normal.         ASSESSMENT and PLAN    ICD-10-CM ICD-9-CM    1. Essential hypertension  N55 879.4 METABOLIC PANEL, COMPREHENSIVE   2. Hypercholesteremia  E78.00 272.0 LIPID PANEL       BP up today. Not clear why--states she did not take her meds yet. It was 152/60 at cardiology just 2 weeks ago  Is already on multiple meds with not much room to change    Update lab today    Reviewed cardiology ordered lab And cardiology note from August. No cholesterol was done    Instructed to check BP at home weekly until f/u and to take her BP meds before she comes in next time. Declines flu vax  Will consider COVID vax and Pneumococcal-23. Asked about J&J COVID vax specifically.     recheck BP 4-6 weeks

## 2021-10-01 ENCOUNTER — OFFICE VISIT (OUTPATIENT)
Dept: INTERNAL MEDICINE CLINIC | Age: 86
End: 2021-10-01
Payer: MEDICARE

## 2021-10-01 VITALS
OXYGEN SATURATION: 99 % | TEMPERATURE: 97 F | DIASTOLIC BLOOD PRESSURE: 59 MMHG | RESPIRATION RATE: 17 BRPM | HEIGHT: 59 IN | BODY MASS INDEX: 33.63 KG/M2 | WEIGHT: 166.8 LBS | SYSTOLIC BLOOD PRESSURE: 148 MMHG | HEART RATE: 61 BPM

## 2021-10-01 DIAGNOSIS — E78.00 HYPERCHOLESTEREMIA: ICD-10-CM

## 2021-10-01 DIAGNOSIS — I10 ESSENTIAL HYPERTENSION: Primary | ICD-10-CM

## 2021-10-01 PROCEDURE — G8432 DEP SCR NOT DOC, RNG: HCPCS | Performed by: INTERNAL MEDICINE

## 2021-10-01 PROCEDURE — G8536 NO DOC ELDER MAL SCRN: HCPCS | Performed by: INTERNAL MEDICINE

## 2021-10-01 PROCEDURE — 99213 OFFICE O/P EST LOW 20 MIN: CPT | Performed by: INTERNAL MEDICINE

## 2021-10-01 PROCEDURE — 1090F PRES/ABSN URINE INCON ASSESS: CPT | Performed by: INTERNAL MEDICINE

## 2021-10-01 PROCEDURE — 1101F PT FALLS ASSESS-DOCD LE1/YR: CPT | Performed by: INTERNAL MEDICINE

## 2021-10-01 PROCEDURE — G8427 DOCREV CUR MEDS BY ELIG CLIN: HCPCS | Performed by: INTERNAL MEDICINE

## 2021-10-01 PROCEDURE — G8417 CALC BMI ABV UP PARAM F/U: HCPCS | Performed by: INTERNAL MEDICINE

## 2021-10-01 NOTE — PROGRESS NOTES
Reviewed record in preparation for visit and have obtained necessary documentation. Bony Guerrero is a 80 y.o.  female presents today for office visit for   Chief Complaint   Patient presents with    Hypertension   . Pt is  fasting. Patient is accompanied by daughter. I have received verbal consent from Bony Guerrero to discuss any/all medical information while they are present in the room. Pt is in Room # [address] preferred language for health care discussion is english/other. Is the patient using any DME equipment during OV? YES    Advance Directive:  1. Do you have an advance directive in place? Patient Reply: NO    2. If not, would you like material regarding how to put one in place? NO    Coordination of Care:  1. Have you been to the ER, urgent care clinic since your last visit? Hospitalized since your last visit? NO    2. Have you seen or consulted any other health care providers outside of the [address] [place] [address] since your last visit? Include any pap smears or colon screening. YES, Dr. Aris Davidson, Cardiology 8/2021    Upcoming Appts  Cardiology 12/2021, Dr. Noé Avelar: No orders of the defined types were placed in this encounter.  Norbert Dc MD/Cate Andrews LPN    Bony Guerrero is due for:   Health Maintenance Due   Topic    COVID-19 Vaccine (1)    Pneumococcal 65+ years (1 of 1 - PPSV23)    Flu Vaccine (1)     Health Maintenance reviewed and discussed per provider  Please order/place referral if appropriate.     Requested Prescriptions      No prescriptions requested or ordered in this encounter       Learning Assessment:  Learning Assessment 8/1/2016   PRIMARY LEARNER Patient   HIGHEST LEVEL OF EDUCATION - PRIMARY LEARNER  GRADUATED HIGH SCHOOL OR GED   PRIMARY LANGUAGE ENGLISH   LEARNER PREFERENCE PRIMARY DEMONSTRATION   ANSWERED BY MARGIE Robbins   RELATIONSHIP SELF     Depression Screening:  3 most recent [facility] Screens 5/5/2021 4/15/2021 3/25/2021 2/27/2020 11/26/2019 1/28/2019 5/29/2018   Little interest or pleasure in doing things Not at all Not at all Not at all Not at all Not at all Not at all Not at all   Feeling down, depressed, irritable, or hopeless Not at all Not at all Not at all Not at all Not at all Not at all Not at all   Total Score PHQ 2 0 0 0 0 0 0 0     Abuse Screening:  Abuse Screening Questionnaire 5/5/2021 2/27/2020 11/28/2017 9/28/2017 8/1/2016   Do you ever feel afraid of your partner? N N N N N   Are you in a relationship with someone who physically or mentally threatens you? N N N N N   Is it safe for you to go home? Ashley DELGADO     Fall Risk  Fall Risk Assessment, last 12 mths 5/5/2021 4/15/2021 3/25/2021 2/27/2020 11/26/2019 4/29/2019 1/28/2019   Able to walk? Yes Yes Yes Yes Yes Yes Yes   Fall in past 12 months? 1 1 1 No No No Yes   Do you feel unsteady? 0 0 - - - - -   Are you worried about falling 0 1 - - - - -   Is TUG test greater than 12 seconds? 0 0 - - - - -   Is the gait abnormal? 0 1 - - - - -   Number of falls in past 12 months 2 2 1 - - - 1   Fall with injury? 1 1 1 - - - 0     Recent Travel Screening and Travel History documentation     Travel Screening     Question   Response    In the last month, have you been in contact with someone who was confirmed or suspected to have Coronavirus / COVID-19? No / Unsure    Have you had a COVID-19 viral test in the last 14 days? No    Do you have any of the following new or worsening symptoms? None of these    Have you traveled internationally or domestically in the last month?   No      Travel History   Travel since 09/01/21     No documented travel since 09/01/21

## 2021-10-01 NOTE — PROGRESS NOTES
HISTORY OF PRESENT ILLNESS  Maria Del Carmen Mancera is a 80 y.o. female. BP (!) 148/59 (BP 1 Location: Right arm, BP Patient Position: Sitting, BP Cuff Size: Large adult)   Pulse 61   Temp 97 °F (36.1 °C) (Temporal)   Resp 17   Ht 4' 11\" (1.499 m)   Wt 166 lb 12.8 oz (75.7 kg)   SpO2 99%   BMI 33.69 kg/m²     Hypertension   The history is provided by the patient. This is a chronic problem. The current episode started more than 1 week ago. The problem has not changed since onset. Pertinent negatives include no chest pain, no palpitations and no shortness of breath. Review of Systems   Constitutional: Negative for chills and fever. HENT: Positive for nosebleeds (once, this week. Resolved quickly). Respiratory: Negative for shortness of breath. Cardiovascular: Negative for chest pain and palpitations. Physical Exam  Vitals and nursing note reviewed. Constitutional:       Appearance: Normal appearance. She is well-developed. HENT:      Head: Normocephalic and atraumatic. Cardiovascular:      Rate and Rhythm: Normal rate and regular rhythm. Pulmonary:      Effort: Pulmonary effort is normal.      Breath sounds: Normal breath sounds. Musculoskeletal:      Right lower leg: No edema. Left lower leg: No edema. Skin:     General: Skin is warm and dry. Neurological:      General: No focal deficit present. Mental Status: She is alert and oriented to person, place, and time. Psychiatric:         Mood and Affect: Mood normal.         Behavior: Behavior normal.         ASSESSMENT and PLAN    ICD-10-CM ICD-9-CM    1. Essential hypertension  I10 401.9    2. Hypercholesteremia  E78.00 272.0        BP much better today    Removed old meds from her list    Cholesterol is very high, but given her age, would not pursue any further. Do not think she is taking the lipitor.  Risk vs benefit is not likely significant    Declines vaccines including COVID and flu    F/u 4 months for recheck

## 2021-11-15 ENCOUNTER — PATIENT OUTREACH (OUTPATIENT)
Dept: CASE MANAGEMENT | Age: 86
End: 2021-11-15

## 2021-11-15 NOTE — PROGRESS NOTES
Transition of care outreach postponed for 14 days due to patient's discharge to SNF.     SOH: 11/7/21-11/12/21 Rhabdomyolysis  Transferred to [address] [place] [address]    COVID negative  No AMD on file

## 2021-11-24 ENCOUNTER — TELEPHONE (OUTPATIENT)
Dept: INTERNAL MEDICINE CLINIC | Age: 86
End: 2021-11-24

## 2021-11-24 NOTE — TELEPHONE ENCOUNTER
S/w the pt's daughter, emergency contact (EC) FELIPE Allan. EC regarding to concerns. Pt's daughter states pt is currently at [facility] and wanted Dr. Dong Mena to order test for the pt's dizziness. EC advised the pt is currently under the care of the rehab facility and encourages the [facility] to contact the Nurse Manager, MSW or MD to discuss her concerns about the pt. EC acknowledges understanding and states will call the facility. Encounter closed.

## 2021-11-29 ENCOUNTER — PATIENT OUTREACH (OUTPATIENT)
Dept: CASE MANAGEMENT | Age: 86
End: 2021-11-29

## 2021-11-29 NOTE — PROGRESS NOTES
[address] [facility] for SNF follow up. Spoke to [facility]. Provided 2 patient identifiers. Patient remains a current admission at this time. Call transferred to social work office. Anticipated discharge date unknown at this time. PT/OT ongoing. Ms. Kirill Aquino verbalized she will contact writer with status update. CTN provided contact info.

## 2021-12-06 ENCOUNTER — PATIENT OUTREACH (OUTPATIENT)
Dept: CASE MANAGEMENT | Age: 86
End: 2021-12-06

## 2021-12-06 NOTE — PROGRESS NOTES
[address] [facility] for SNF follow up. Spoke to [facility]. Provided 2 patient identifiers. Patient remains a current admission at this time. Anticipated discharge date unknown.

## 2021-12-13 ENCOUNTER — PATIENT OUTREACH (OUTPATIENT)
Dept: CASE MANAGEMENT | Age: 86
End: 2021-12-13

## 2021-12-13 RX ORDER — VALSARTAN 80 MG/1
80 TABLET ORAL DAILY
COMMUNITY
Start: 2021-12-11 | End: 2022-01-18

## 2021-12-13 RX ORDER — AMLODIPINE BESYLATE 5 MG/1
5 TABLET ORAL DAILY
COMMUNITY
Start: 2021-08-23 | End: 2022-01-11 | Stop reason: SDUPTHER

## 2021-12-13 NOTE — PROGRESS NOTES
Contacted [facility] for SNF follow up. Spoke to [facility]. Provided 2 patient identifiers. Patient remains a current admission at this time. Anticipated discharge date 12/10/21. Care Transitions Initial Call    Call within 2 business days of discharge: Yes     Patient: Maral Dawn Patient : 8/15/1932 MRN: [id number]    Last Discharge [address]       Complaint Diagnosis Description Type Department Provider    3/17/21 Fall Diarrhea, unspecified type ED (DISCHARGE) GITA Elias Ask, DO          Was this an external facility discharge? Yes, 21-21 Discharge Facility: [facility]: Rhabdomyolosis    Challenges to be reviewed by the provider   Additional needs identified to be addressed with provider: yes  medications- Patient discharged fron SNF on 12/10. New prescriptions at pharmacy; family waiting to be notified that prescriptions are ready. Daughter voiced patient only taking 2 meds at this time; amlodipine 5 mg and valsartan 80 mg. Per daughter, directions on bottle indicate if SBP < 100 and/or HR < 50 to give patient medication. CTN reviewed instructions and advised daughter she is to check patient's BP before giving amlodipine and valsartan and if SBP is < 100 and/or HR < 50  then do not give patient either of these 2 medications. MARGIE appt pending but will instruct daughter to bring all medications to appt. Method of communication with provider : chart routing    Discussed COVID-19 related testing which was available at this time. Test results were negative. Patient informed of results, if available? yes     Advance Care Planning:   Does patient have an Advance Directive:  currently not on file; education provided     Inpatient Readmission Risk score: No data recorded  Was this a readmission?  no   Patient stated reason for the admission: N/A    Patients top risk factors for readmission: medical condition-rhabdomyolosis   Interventions to address risk factors: Scheduled appointment with PCP-MARGIE appt request sent, Assessment and support for treatment adherence and medication management-Reviewed Norvasc instructions and Establishment or re-establishment of referrals-notify PCP of SNF recommendation for outpatient PT    Care Transition Nurse (CTN) contacted the patient by telephone to perform post hospital discharge assessment. Call answered by Brook Epley, daughter (listed on PHI). Verified name and  with family as identifiers. Provided introduction to self, and explanation of the CTN role. Daughter voiced patient was in the bathroom. Daughter reported patient is doing well. Moving slow but denied any c/o pain. Patient ambulating with use of standard walker but does have rolling walker on hand. Patient staying with daughter while recovering. Patient became available during call. CTN provided introduction and reason for call. CTN reviewed discharge instructions, medical action plan and red flags with patient who verbalized understanding. Were discharge instructions available to patient? yes. Reviewed appropriate site of care based on symptoms and resources available to patient including: PCP, When to call 911 and CTN. Patient given an opportunity to ask questions and does not have any further questions or concerns at this time. The patient agrees to contact the PCP office for questions related to their healthcare. Attempted medication reconciliation was performed with family. Family voiced patient only taking 2 prescriptions at this time. valsartan 80 mg and amlodipine 5 mg. Daughter voiced instructions on bottle state to give patient amlodipine if SBP < 100 or HR < 50. Advised daughter she is not to give patient either of these medicines if SBP < 100 or HR < 50. Daughter voiced understanding and provided verbal teach back. Awaiting pharmacy to fill prescriptions given at d/c from SNF.    Referral to Pharm D needed: no        Covid Risk Education  Educated patient about risk for severe COVID-19 due to risk factors according to CDC guidelines. CTN reviewed discharge instructions, medical action plan and red flag symptoms with the patient who verbalized understanding. Discussed COVID vaccination status: no. Education provided on COVID-19 vaccination as appropriate. Discussed exposure protocols and quarantine with CDC Guidelines. Patient was given an opportunity to verbalize any questions and concerns and agrees to contact CTN or health care provider for questions related to their healthcare. Discussed follow-up appointments. If no appointment was previously scheduled, appointment scheduling offered: yes. Is follow up appointment scheduled within 7 days of discharge? TBD.   [facility] follow up appointment(s):   Future Appointments   Date Time Provider Ruba Paredes   1/18/2022 11:30 AM Patria Gusman MD A BS [facility]     Non[facility] follow up appointment(s): None    Plan for follow-up call in 1-2 days based on severity of symptoms and risk factors. Plan for next call: medication management-reconcile medications  CTN provided contact information for future needs. Goals Addressed                 This Visit's Progress     Attends follow-up appointments as directed. Goal: Patient will attend all appointments scheduled within the next 30 days. Ensure provider appt is scheduled within 7 business days post-discharge; 12/13: MARGIE appt request sent. Confirm patient attended post-discharge provider appt   Complete post-visit call to confirm attendance and update care needs           Prevent complications post hospitalization. Goal: No admissions post 30 days from discharge of 11/12/21.       Review/educate common or potential \"red flags\" of condition worsening; 12/13: Reviewed/educated on s/s of rhabdomyolysis to monitor/report:   Tea-colored urine  Decreased urination  Muscle weakness, tenderness or swelling  LOC (loss of consciousness)    Evaluate adherence to medications and priority barriers to resolve      Instruct on adherence to medications as ordered and assess for therapeutic response and side-effects         Communicate visits and goals between multiple providers and services    Assess for health risk behaviors and educate patient/caregivers on reducing risk;  2/13: Reinforced adequate oral hydration    Discuss and provide resources for ACP; 12/13: Patient verbalized she has an ACP and her daughters have copies. Suggested providing copy to PCP to have included in medical chart.

## 2021-12-14 ENCOUNTER — PATIENT OUTREACH (OUTPATIENT)
Dept: CASE MANAGEMENT | Age: 86
End: 2021-12-14

## 2021-12-14 NOTE — PROGRESS NOTES
Attempted to reach patient and Ammon Stuart, daughter to complete medication reconciliation. No answer. Left HIPPA compliant message. Name, role and contact information provided. Requested return call. Will attempt to contact at a later time.

## 2021-12-15 ENCOUNTER — PATIENT OUTREACH (OUTPATIENT)
Dept: CASE MANAGEMENT | Age: 86
End: 2021-12-15

## 2021-12-15 RX ORDER — OMEGA-3-ACID ETHYL ESTERS 1 G/1
1 CAPSULE, LIQUID FILLED ORAL
COMMUNITY
End: 2022-01-18

## 2021-12-15 RX ORDER — LOTEPREDNOL ETABONATE 5 MG/G
1 GEL OPHTHALMIC DAILY
COMMUNITY

## 2021-12-15 RX ORDER — ACETAMINOPHEN 325 MG/1
650 TABLET ORAL
COMMUNITY

## 2021-12-15 RX ORDER — METOPROLOL SUCCINATE 25 MG/1
25 TABLET, EXTENDED RELEASE ORAL DAILY
COMMUNITY
End: 2022-01-18

## 2021-12-15 NOTE — PROGRESS NOTES
Care Transitions Follow Up Call    Challenges to be reviewed by the provider   Additional needs identified to be addressed with provider: yes  medications- Meds reconciled with daughter. Patient not taking the following meds at this time: Lasix, MVI, Lipitor, Exforge, Lumigan or Refresh. Patient taking Norvasc, Diovan, and Toprol XL for HTN. Daughter expressed concern with giving patient all three meds at the same time. Advised daughter she may give BP medications together after she checks BP. Advised to HOLD BP meds if SBP is less than 100. Daughter voiced understanding. Method of communication with provider : chart routing    Care Transition Nurse (CTN) contacted Shonda Reaves, daughter/ family by telephone to follow up after admission on 21. Verified name and  with family as identifiers. Addressed changes since last contact: medications- Daughter voiced directions for amlodipine state to take if SBP < 100 or HR < 50. Daughter sent CTN picture of Norvasc bottle via text. CTN verified instructions were as daughter relayed. CTN informed daughter this was incorrect and giving BP medicine for SBP < 100 or < 50 will drop BP and/or HR lower. Instructed daughter to monitor patient's BP before giving BP medications and if SBP is less than 100 and HR is less than 50 DO NOT give patient BP medications. Daughter provided verbal teach back of instructions given by CTN. Follow up appointment completed? no.   Was follow up appointment scheduled within 7 days of discharge? pending. Advance Care Planning:   Does patient have an Advance Directive:  currently not on file; education provided     CTN reviewed discharge instructions, medical action plan and red flags with family and discussed any barriers to care and/or understanding of plan of care after discharge. Discussed appropriate site of care based on symptoms and resources available to patient including: PCP, Specialist, When to call 911 and CTN. The family agrees to contact the PCP office for questions related to their healthcare. Patients top risk factors for readmission: medication management   Interventions to address risk factors: Assessment and support for treatment adherence and medication management-Reviewed instructions for BP medications, rationale for HOLDING BP meds and signs of bleeding as patient is taking Plavix. [address] follow up appointment(s):   Future Appointments   Date Time Provider Ruba Priesti   1/18/2022 11:30 AM Jude Gusman MD A BS [facility]     Non-BS follow up appointment(s): Suggested scheduling cardiology follow up appt in near future. CTN provided contact information for future needs. No further follow-up call indicated based on severity of symptoms and risk factors. Plan for next call: None     Goals Addressed                 This Visit's Progress     COMPLETED: Attends follow-up appointments as directed. On track     Goal: Patient will attend all appointments scheduled within the next 30 days. Ensure provider appt is scheduled within 7 business days post-discharge; 12/13: MARGIE appt request sent.  COMPLETED: Prevent complications post hospitalization. On track     Goal: No admissions post 30 days from discharge of 11/12/21. Review/educate common or potential \"red flags\" of condition worsening; 12/13: Reviewed/educated on s/s of rhabdomyolysis to monitor/report:   Tea-colored urine  Decreased urination  Muscle weakness, tenderness or swelling  LOC (loss of consciousness)    Evaluate adherence to medications and priority barriers to resolve; 12/15: Reconciled medications, reviewed instructions for HOLDING BP medications      Instruct on adherence to medications as ordered and assess for therapeutic response and side-effects;12/15: Reviewed signs of bleeding while taking Plavix.         Assess for health risk behaviors and educate patient/caregivers on reducing risk;  2/13: Reinforced adequate oral hydration    Discuss and provide resources for ACP; 12/13: Patient verbalized she has an ACP and her daughters have copies. Suggested providing copy to PCP to have included in medical chart.

## 2022-01-11 DIAGNOSIS — Z76.0 MEDICATION REFILL: ICD-10-CM

## 2022-01-11 DIAGNOSIS — I10 ESSENTIAL HYPERTENSION: ICD-10-CM

## 2022-01-11 RX ORDER — AMLODIPINE AND VALSARTAN 5; 320 MG/1; MG/1
1 TABLET ORAL DAILY
Qty: 90 TABLET | Refills: 5 | Status: CANCELLED | OUTPATIENT
Start: 2022-01-11

## 2022-01-11 RX ORDER — AMLODIPINE BESYLATE 5 MG/1
5 TABLET ORAL DAILY
Qty: 90 TABLET | Refills: 1 | Status: SHIPPED | OUTPATIENT
Start: 2022-01-11 | End: 2022-01-18

## 2022-01-11 NOTE — TELEPHONE ENCOUNTER
Patient's daughter is calling in the refill. Has an appt to see you on 1/18/22. Requested Prescriptions     Pending Prescriptions Disp Refills    amLODIPine (NORVASC) 5 mg tablet       Sig: Take 1 Tablet by mouth daily.

## 2022-01-18 ENCOUNTER — OFFICE VISIT (OUTPATIENT)
Dept: INTERNAL MEDICINE CLINIC | Age: 87
End: 2022-01-18
Payer: MEDICARE

## 2022-01-18 ENCOUNTER — HOSPITAL ENCOUNTER (OUTPATIENT)
Dept: LAB | Age: 87
Discharge: HOME | End: 2022-01-18
Payer: MEDICARE

## 2022-01-18 VITALS
BODY MASS INDEX: 34.07 KG/M2 | OXYGEN SATURATION: 100 % | SYSTOLIC BLOOD PRESSURE: 136 MMHG | WEIGHT: 169 LBS | TEMPERATURE: 96.9 F | HEART RATE: 72 BPM | RESPIRATION RATE: 16 BRPM | HEIGHT: 59 IN | DIASTOLIC BLOOD PRESSURE: 59 MMHG

## 2022-01-18 DIAGNOSIS — I10 ESSENTIAL HYPERTENSION: ICD-10-CM

## 2022-01-18 DIAGNOSIS — G45.9 TIA (TRANSIENT ISCHEMIC ATTACK): ICD-10-CM

## 2022-01-18 DIAGNOSIS — I10 ESSENTIAL HYPERTENSION: Primary | ICD-10-CM

## 2022-01-18 DIAGNOSIS — E78.00 HYPERCHOLESTEREMIA: ICD-10-CM

## 2022-01-18 DIAGNOSIS — R60.0 PEDAL EDEMA: ICD-10-CM

## 2022-01-18 LAB
ALBUMIN SERPL-MCNC: 4 G/DL (ref 3.4–5)
ALBUMIN/GLOB SERPL: 1.1 {RATIO} (ref 0.8–1.7)
ALP SERPL-CCNC: 80 U/L (ref 45–117)
ALT SERPL-CCNC: 16 U/L (ref 13–56)
ANION GAP SERPL CALC-SCNC: 4 MMOL/L (ref 3–18)
AST SERPL-CCNC: 15 U/L (ref 10–38)
BILIRUB SERPL-MCNC: 0.7 MG/DL (ref 0.2–1)
BUN SERPL-MCNC: 19 MG/DL (ref 7–18)
BUN/CREAT SERPL: 24 (ref 12–20)
CALCIUM SERPL-MCNC: 9.6 MG/DL (ref 8.5–10.1)
CHLORIDE SERPL-SCNC: 107 MMOL/L (ref 100–111)
CHOLEST SERPL-MCNC: 259 MG/DL
CO2 SERPL-SCNC: 28 MMOL/L (ref 21–32)
CREAT SERPL-MCNC: 0.79 MG/DL (ref 0.6–1.3)
GLOBULIN SER CALC-MCNC: 3.8 G/DL (ref 2–4)
GLUCOSE SERPL-MCNC: 80 MG/DL (ref 74–99)
HDLC SERPL-MCNC: 51 MG/DL (ref 40–60)
HDLC SERPL: 5.1 {RATIO} (ref 0–5)
LDLC SERPL CALC-MCNC: 192.4 MG/DL (ref 0–100)
LIPID PROFILE,FLP: ABNORMAL
POTASSIUM SERPL-SCNC: 4.6 MMOL/L (ref 3.5–5.5)
PROT SERPL-MCNC: 7.8 G/DL (ref 6.4–8.2)
SODIUM SERPL-SCNC: 139 MMOL/L (ref 136–145)
TRIGL SERPL-MCNC: 78 MG/DL (ref ?–150)
VLDLC SERPL CALC-MCNC: 15.6 MG/DL

## 2022-01-18 PROCEDURE — 1101F PT FALLS ASSESS-DOCD LE1/YR: CPT | Performed by: INTERNAL MEDICINE

## 2022-01-18 PROCEDURE — G8417 CALC BMI ABV UP PARAM F/U: HCPCS | Performed by: INTERNAL MEDICINE

## 2022-01-18 PROCEDURE — G8427 DOCREV CUR MEDS BY ELIG CLIN: HCPCS | Performed by: INTERNAL MEDICINE

## 2022-01-18 PROCEDURE — G8536 NO DOC ELDER MAL SCRN: HCPCS | Performed by: INTERNAL MEDICINE

## 2022-01-18 PROCEDURE — 1090F PRES/ABSN URINE INCON ASSESS: CPT | Performed by: INTERNAL MEDICINE

## 2022-01-18 PROCEDURE — 80061 LIPID PANEL: CPT

## 2022-01-18 PROCEDURE — 80053 COMPREHEN METABOLIC PANEL: CPT

## 2022-01-18 PROCEDURE — G8510 SCR DEP NEG, NO PLAN REQD: HCPCS | Performed by: INTERNAL MEDICINE

## 2022-01-18 PROCEDURE — 99213 OFFICE O/P EST LOW 20 MIN: CPT | Performed by: INTERNAL MEDICINE

## 2022-01-18 PROCEDURE — 36415 COLL VENOUS BLD VENIPUNCTURE: CPT

## 2022-01-18 RX ORDER — CLOPIDOGREL BISULFATE 75 MG/1
75 TABLET ORAL DAILY
Qty: 90 TABLET | Refills: 1 | Status: SHIPPED | OUTPATIENT
Start: 2022-01-18 | End: 2022-01-24 | Stop reason: SDUPTHER

## 2022-01-18 RX ORDER — AMLODIPINE AND VALSARTAN 5; 320 MG/1; MG/1
1 TABLET ORAL DAILY
COMMUNITY
End: 2022-02-14

## 2022-01-18 NOTE — PROGRESS NOTES
1. \"Have you been to the ER, urgent care clinic since your last visit? Hospitalized since your last visit? \" No    2. \"Have you seen or consulted any other health care providers outside of the [address] [place] [address] since your last visit? \" No     3. For patients aged 39-70: Has the patient had a colonoscopy / FIT/ Cologuard? NA - based on age      If the patient is female:    4. For patients aged 41-77: Has the patient had a mammogram within the past 2 years? NA - based on age or sex  See top three    5. For patients aged 21-65: Has the patient had a pap smear? NA - based on age or sex      Patient declines the influenza, COVID-19 AND PNEUMOCCOCAL vaccines at this time.

## 2022-01-18 NOTE — PROGRESS NOTES
HISTORY OF PRESENT ILLNESS  Michelle Hopson is a 80 y.o. female. BP (!) 136/59 (BP 1 Location: Left lower arm)   Pulse 72   Temp 96.9 °F (36.1 °C) (Temporal)   Resp 16   Ht 4' 11\" (1.499 m)   Wt 169 lb (76.7 kg)   SpO2 100%   BMI 34.13 kg/m²     Dale Apley again in November  Living with daughter currently and getting home PT still    Hypertension   The history is provided by the patient. This is a chronic problem. The current episode started more than 1 week ago. The problem has not changed since onset. Pertinent negatives include no chest pain and no palpitations. There are no associated agents to hypertension. Risk factors include dyslipidemia. Cholesterol Problem  The history is provided by the patient. This is a chronic problem. The current episode started more than 1 week ago. Pertinent negatives include no chest pain. Review of Systems   Constitutional: Negative for chills and fever. Cardiovascular: Negative for chest pain and palpitations. Gastrointestinal:        Appetite is good       Physical Exam  Vitals and nursing note reviewed. Constitutional:       Appearance: Normal appearance. She is well-developed. HENT:      Head: Normocephalic and atraumatic. Cardiovascular:      Rate and Rhythm: Normal rate and regular rhythm. Pulmonary:      Effort: Pulmonary effort is normal.      Breath sounds: Normal breath sounds. Musculoskeletal:      Right lower leg: Edema present. Left lower leg: Edema present. Skin:     General: Skin is warm and dry. Neurological:      General: No focal deficit present. Mental Status: She is alert and oriented to person, place, and time. Psychiatric:         Mood and Affect: Mood normal.         Behavior: Behavior normal.         ASSESSMENT and PLAN    ICD-10-CM ICD-9-CM    1. Essential hypertension  V60 696.1 METABOLIC PANEL, COMPREHENSIVE   2. Hypercholesteremia  E78.00 272.0 LIPID PANEL   3. Pedal edema  R60.0 782.3    4.  TIA (transient ischemic attack)  G45.9 435.9          BP looks good here  Updated meds    Update lab    Declines all vaccines    F/u 3 months

## 2022-01-24 RX ORDER — CLOPIDOGREL BISULFATE 75 MG/1
75 TABLET ORAL DAILY
Qty: 90 TABLET | Refills: 1 | Status: SHIPPED | OUTPATIENT
Start: 2022-01-24 | End: 2022-04-19 | Stop reason: SDUPTHER

## 2022-01-24 NOTE — TELEPHONE ENCOUNTER
Electronic receipt was noted last week. New RX sent today and receipt again received electronically. Did they change pharmacies?

## 2022-01-24 NOTE — TELEPHONE ENCOUNTER
Spoke with the pt's daughter and informed the pharmacy called back and says they found the prescription and it will be ready this afternoon. No further action at this time. Encounter closed.

## 2022-01-24 NOTE — TELEPHONE ENCOUNTER
Patient's daughter is calling in stating she spoke with [facility] twice and they are telling her they never received the Rx sent in on 1/18/22. Asking that you please resend it.     Thank you

## 2022-02-14 DIAGNOSIS — Z76.0 ENCOUNTER FOR ISSUE OF REPEAT PRESCRIPTION: ICD-10-CM

## 2022-02-14 DIAGNOSIS — I10 ESSENTIAL (PRIMARY) HYPERTENSION: ICD-10-CM

## 2022-02-14 RX ORDER — AMLODIPINE AND VALSARTAN 5; 320 MG/1; MG/1
TABLET ORAL
Qty: 90 TABLET | Refills: 5 | Status: SHIPPED | OUTPATIENT
Start: 2022-02-14

## 2022-03-19 PROBLEM — E66.01 SEVERE OBESITY (BMI 35.0-39.9) WITH COMORBIDITY (HCC): Status: ACTIVE | Noted: 2018-05-29

## 2022-03-20 PROBLEM — I10 ESSENTIAL HYPERTENSION: Status: ACTIVE | Noted: 2017-09-28

## 2022-04-19 ENCOUNTER — OFFICE VISIT (OUTPATIENT)
Dept: INTERNAL MEDICINE CLINIC | Age: 87
End: 2022-04-19
Payer: MEDICARE

## 2022-04-19 VITALS
HEIGHT: 59 IN | WEIGHT: 169 LBS | DIASTOLIC BLOOD PRESSURE: 67 MMHG | HEART RATE: 63 BPM | BODY MASS INDEX: 34.07 KG/M2 | TEMPERATURE: 97.6 F | SYSTOLIC BLOOD PRESSURE: 168 MMHG | RESPIRATION RATE: 18 BRPM | OXYGEN SATURATION: 99 %

## 2022-04-19 DIAGNOSIS — I10 ESSENTIAL (PRIMARY) HYPERTENSION: Primary | ICD-10-CM

## 2022-04-19 DIAGNOSIS — E78.00 HYPERCHOLESTEREMIA: ICD-10-CM

## 2022-04-19 DIAGNOSIS — R73.9 ELEVATED BLOOD SUGAR: ICD-10-CM

## 2022-04-19 DIAGNOSIS — R60.0 PEDAL EDEMA: ICD-10-CM

## 2022-04-19 PROCEDURE — 99213 OFFICE O/P EST LOW 20 MIN: CPT | Performed by: INTERNAL MEDICINE

## 2022-04-19 PROCEDURE — 1101F PT FALLS ASSESS-DOCD LE1/YR: CPT | Performed by: INTERNAL MEDICINE

## 2022-04-19 PROCEDURE — G8510 SCR DEP NEG, NO PLAN REQD: HCPCS | Performed by: INTERNAL MEDICINE

## 2022-04-19 PROCEDURE — G8417 CALC BMI ABV UP PARAM F/U: HCPCS | Performed by: INTERNAL MEDICINE

## 2022-04-19 PROCEDURE — 1090F PRES/ABSN URINE INCON ASSESS: CPT | Performed by: INTERNAL MEDICINE

## 2022-04-19 PROCEDURE — G8536 NO DOC ELDER MAL SCRN: HCPCS | Performed by: INTERNAL MEDICINE

## 2022-04-19 PROCEDURE — G8427 DOCREV CUR MEDS BY ELIG CLIN: HCPCS | Performed by: INTERNAL MEDICINE

## 2022-04-19 RX ORDER — FUROSEMIDE 20 MG/1
TABLET ORAL
Qty: 90 TABLET | Refills: 1 | Status: SHIPPED | OUTPATIENT
Start: 2022-04-19

## 2022-04-19 RX ORDER — CLOPIDOGREL BISULFATE 75 MG/1
75 TABLET ORAL DAILY
Qty: 90 TABLET | Refills: 3 | Status: SHIPPED | OUTPATIENT
Start: 2022-04-19

## 2022-04-19 NOTE — PROGRESS NOTES
1. \"Have you been to the ER, urgent care clinic since your last visit? Hospitalized since your last visit? \" No    2. \"Have you seen or consulted any other health care providers outside of the [address] [place] [address] since your last visit? \" No     3. For patients aged 39-70: Has the patient had a colonoscopy / FIT/ Cologuard? NA - based on age      If the patient is female:    4. For patients aged 41-77: Has the patient had a mammogram within the past 2 years? NA - based on age or sex      11. For patients aged 21-65: Has the patient had a pap smear?  NA - based on age or sex

## 2022-04-19 NOTE — PROGRESS NOTES
HISTORY OF PRESENT ILLNESS  Drew Rich is a 80 y.o. female. BP (!) 168/67 (BP 1 Location: Left lower arm)   Pulse 63   Temp 97.6 °F (36.4 °C) (Temporal)   Resp 18   Ht 4' 11\" (1.499 m)   Wt 169 lb (76.7 kg)   SpO2 99%   BMI 34.13 kg/m²     Here with her daughter. Feels well today    Hypertension   The history is provided by the patient. This is a chronic problem. The current episode started more than 1 week ago. The problem has not changed since onset. Pertinent negatives include no headaches, no dizziness and no shortness of breath. Cholesterol Problem  Pertinent negatives include no headaches and no shortness of breath. Review of Systems   Constitutional: Negative for chills and fever. Respiratory: Negative for shortness of breath. Cardiovascular: Positive for leg swelling (nothing unusual). Musculoskeletal: Falls: no recent falls. Neurological: Negative for dizziness and headaches. Physical Exam  Vitals and nursing note reviewed. Constitutional:       Appearance: Normal appearance. She is well-developed. Cardiovascular:      Rate and Rhythm: Normal rate and regular rhythm. Pulmonary:      Effort: Pulmonary effort is normal.      Breath sounds: Normal breath sounds. Skin:     General: Skin is warm and dry. Neurological:      General: No focal deficit present. Mental Status: She is alert and oriented to person, place, and time. Psychiatric:         Mood and Affect: Mood normal.         Behavior: Behavior normal.         ASSESSMENT and PLAN    ICD-10-CM ICD-9-CM    1. Essential (primary) hypertension  I10 401.9    2. Hypercholesteremia  E78.00 272.0    3. Pedal edema  R60.0 782.3    4. Elevated blood sugar  R73.9 790.29        BP up today. Wt is stable  Will add prn lasix for edema which will help the BP  When she was living with her family her systolic was always in the low 150's    Other wise she looks well and feels well.     F/u 3 months for MWV, HTN, chol

## 2022-07-19 ENCOUNTER — HOSPITAL ENCOUNTER (OUTPATIENT)
Dept: LAB | Age: 87
Discharge: HOME | End: 2022-07-19
Payer: MEDICARE

## 2022-07-19 ENCOUNTER — OFFICE VISIT (OUTPATIENT)
Dept: INTERNAL MEDICINE CLINIC | Age: 87
End: 2022-07-19
Payer: MEDICARE

## 2022-07-19 VITALS
DIASTOLIC BLOOD PRESSURE: 79 MMHG | BODY MASS INDEX: 34.47 KG/M2 | SYSTOLIC BLOOD PRESSURE: 152 MMHG | OXYGEN SATURATION: 98 % | HEIGHT: 59 IN | WEIGHT: 171 LBS | HEART RATE: 61 BPM | TEMPERATURE: 97 F | RESPIRATION RATE: 16 BRPM

## 2022-07-19 DIAGNOSIS — I10 ESSENTIAL HYPERTENSION: ICD-10-CM

## 2022-07-19 DIAGNOSIS — E78.00 HYPERCHOLESTEREMIA: ICD-10-CM

## 2022-07-19 DIAGNOSIS — R60.0 PEDAL EDEMA: ICD-10-CM

## 2022-07-19 DIAGNOSIS — R55 PRE-SYNCOPE: ICD-10-CM

## 2022-07-19 DIAGNOSIS — Z00.00 MEDICARE ANNUAL WELLNESS VISIT, SUBSEQUENT: Primary | ICD-10-CM

## 2022-07-19 LAB
ALBUMIN SERPL-MCNC: 3.9 G/DL (ref 3.4–5)
ALBUMIN/GLOB SERPL: 1 {RATIO} (ref 0.8–1.7)
ALP SERPL-CCNC: 77 U/L (ref 45–117)
ALT SERPL-CCNC: 16 U/L (ref 13–56)
ANION GAP SERPL CALC-SCNC: 5 MMOL/L (ref 3–18)
AST SERPL-CCNC: 15 U/L (ref 10–38)
BASOPHILS # BLD: 0 K/UL (ref 0–0.1)
BASOPHILS NFR BLD: 1 % (ref 0–2)
BILIRUB SERPL-MCNC: 0.7 MG/DL (ref 0.2–1)
BUN SERPL-MCNC: 17 MG/DL (ref 7–18)
BUN/CREAT SERPL: 20 (ref 12–20)
CALCIUM SERPL-MCNC: 8.7 MG/DL (ref 8.5–10.1)
CHLORIDE SERPL-SCNC: 106 MMOL/L (ref 100–111)
CHOLEST SERPL-MCNC: 265 MG/DL
CO2 SERPL-SCNC: 31 MMOL/L (ref 21–32)
CREAT SERPL-MCNC: 0.84 MG/DL (ref 0.6–1.3)
DIFFERENTIAL METHOD BLD: ABNORMAL
EOSINOPHIL # BLD: 0 K/UL (ref 0–0.4)
EOSINOPHIL NFR BLD: 1 % (ref 0–5)
ERYTHROCYTE [DISTWIDTH] IN BLOOD BY AUTOMATED COUNT: 14.4 % (ref 11.6–14.5)
GLOBULIN SER CALC-MCNC: 4 G/DL (ref 2–4)
GLUCOSE SERPL-MCNC: 76 MG/DL (ref 74–99)
HCT VFR BLD AUTO: 33.8 % (ref 35–45)
HDLC SERPL-MCNC: 54 MG/DL (ref 40–60)
HDLC SERPL: 4.9 {RATIO} (ref 0–5)
HGB BLD-MCNC: 10.5 G/DL (ref 12–16)
IMM GRANULOCYTES # BLD AUTO: 0 K/UL (ref 0–0.04)
IMM GRANULOCYTES NFR BLD AUTO: 0 % (ref 0–0.5)
LDLC SERPL CALC-MCNC: 193.4 MG/DL (ref 0–100)
LIPID PROFILE,FLP: ABNORMAL
LYMPHOCYTES # BLD: 1.9 K/UL (ref 0.9–3.6)
LYMPHOCYTES NFR BLD: 34 % (ref 21–52)
MCH RBC QN AUTO: 29.7 PG (ref 24–34)
MCHC RBC AUTO-ENTMCNC: 31.1 G/DL (ref 31–37)
MCV RBC AUTO: 95.8 FL (ref 78–100)
MONOCYTES # BLD: 0.5 K/UL (ref 0.05–1.2)
MONOCYTES NFR BLD: 9 % (ref 3–10)
NEUTS SEG # BLD: 3.1 K/UL (ref 1.8–8)
NEUTS SEG NFR BLD: 57 % (ref 40–73)
NRBC # BLD: 0 K/UL (ref 0–0.01)
NRBC BLD-RTO: 0 PER 100 WBC
PLATELET # BLD AUTO: 287 K/UL (ref 135–420)
PMV BLD AUTO: 10.4 FL (ref 9.2–11.8)
POTASSIUM SERPL-SCNC: 4.3 MMOL/L (ref 3.5–5.5)
PROT SERPL-MCNC: 7.9 G/DL (ref 6.4–8.2)
RBC # BLD AUTO: 3.53 M/UL (ref 4.2–5.3)
SODIUM SERPL-SCNC: 142 MMOL/L (ref 136–145)
TRIGL SERPL-MCNC: 88 MG/DL (ref ?–150)
VLDLC SERPL CALC-MCNC: 17.6 MG/DL
WBC # BLD AUTO: 5.6 K/UL (ref 4.6–13.2)

## 2022-07-19 PROCEDURE — 85025 COMPLETE CBC W/AUTO DIFF WBC: CPT

## 2022-07-19 PROCEDURE — 99214 OFFICE O/P EST MOD 30 MIN: CPT | Performed by: INTERNAL MEDICINE

## 2022-07-19 PROCEDURE — G8536 NO DOC ELDER MAL SCRN: HCPCS | Performed by: INTERNAL MEDICINE

## 2022-07-19 PROCEDURE — 1090F PRES/ABSN URINE INCON ASSESS: CPT | Performed by: INTERNAL MEDICINE

## 2022-07-19 PROCEDURE — 80061 LIPID PANEL: CPT

## 2022-07-19 PROCEDURE — 1123F ACP DISCUSS/DSCN MKR DOCD: CPT | Performed by: INTERNAL MEDICINE

## 2022-07-19 PROCEDURE — 1101F PT FALLS ASSESS-DOCD LE1/YR: CPT | Performed by: INTERNAL MEDICINE

## 2022-07-19 PROCEDURE — 36415 COLL VENOUS BLD VENIPUNCTURE: CPT

## 2022-07-19 PROCEDURE — G8510 SCR DEP NEG, NO PLAN REQD: HCPCS | Performed by: INTERNAL MEDICINE

## 2022-07-19 PROCEDURE — G0439 PPPS, SUBSEQ VISIT: HCPCS | Performed by: INTERNAL MEDICINE

## 2022-07-19 PROCEDURE — G8417 CALC BMI ABV UP PARAM F/U: HCPCS | Performed by: INTERNAL MEDICINE

## 2022-07-19 PROCEDURE — G8427 DOCREV CUR MEDS BY ELIG CLIN: HCPCS | Performed by: INTERNAL MEDICINE

## 2022-07-19 PROCEDURE — 80053 COMPREHEN METABOLIC PANEL: CPT

## 2022-07-19 NOTE — PROGRESS NOTES
HISTORY OF PRESENT ILLNESS  Delvin Barnett is a 80 y.o. female. BP (!) 152/79 (BP 1 Location: Left upper arm, BP Patient Position: Sitting, BP Cuff Size: Large adult)   Pulse 61   Temp 97 °F (36.1 °C) (Temporal)   Resp 16   Ht 4' 11\" (1.499 m)   Wt 171 lb (77.6 kg)   SpO2 98%   BMI 34.54 kg/m²     One episode of dizziness while outside sweeping her porch. She had to go inside to use the bathroom for a BM. She got dizzy en route to the bathroom. On arising, she got dizzy and fell between the toilet and the bathtub. EMTs came and got her up. She did not go to the ER for recheck      Her pervious episode was at her daughter's house. EMTs checked her there as well. She was fine after a few minutes. Both times she had been sitting and went to arise    Cholesterol Problem  The history is provided by the patient. This is a chronic problem. The current episode started more than 1 week ago. Pertinent negatives include no chest pain, no headaches and no shortness of breath. Hypertension   The history is provided by the patient. This is a chronic problem. The current episode started more than 1 week ago. Associated symptoms include dizziness. Pertinent negatives include no chest pain, no palpitations, no headaches and no shortness of breath. Review of Systems   Constitutional: Negative for chills and fever. Respiratory: Negative for shortness of breath. Cardiovascular: Negative for chest pain and palpitations. Neurological: Positive for dizziness. Negative for headaches. Physical Exam  Vitals and nursing note reviewed. Constitutional:       Appearance: Normal appearance. She is well-developed. HENT:      Head: Normocephalic and atraumatic. Cardiovascular:      Rate and Rhythm: Normal rate and regular rhythm. Pulmonary:      Effort: Pulmonary effort is normal.      Breath sounds: Normal breath sounds. Musculoskeletal:      Right lower leg: Edema (2+ ankle) present.       Left lower leg: Edema (2+ ankle) present. Comments: atalgic wide based gait, using a cane. Skin:     General: Skin is warm and dry. Neurological:      General: No focal deficit present. Mental Status: She is alert and oriented to person, place, and time. Psychiatric:         Mood and Affect: Mood normal.         Behavior: Behavior normal.         ASSESSMENT and PLAN    ICD-10-CM ICD-9-CM           2. Hypercholesteremia  J01.09 210.1 METABOLIC PANEL, COMPREHENSIVE      LIPID PANEL   3. Essential hypertension  S83 137.9 METABOLIC PANEL, COMPREHENSIVE      LIPID PANEL   4. Pedal edema  R60.0 782.3 CBC WITH AUTOMATED DIFF   5. Pre-syncope  R55 780.2        BP is actually labile and goes UP when she stands  157/67 sitting.  Left upper arm  179/100 one minute after standing  187/85 two minutes after standing    I suspect her dizziness is still orthostatic and related to arising too quickly along with gait impairment  Advised to get up more slowly    Update lab today    F/u 3 months for recheck HTN, chol, dizzy checks

## 2022-07-19 NOTE — PATIENT INSTRUCTIONS
Medicare Wellness Visit, Female     The best way to live healthy is to have a lifestyle where you eat a well-balanced diet, exercise regularly, limit alcohol use, and quit all forms of tobacco/nicotine, if applicable. Regular preventive services are another way to keep healthy. Preventive services (vaccines, screening tests, monitoring & exams) can help personalize your care plan, which helps you manage your own care. Screening tests can find health problems at the earliest stages, when they are easiest to treat. [facility] follows the current, evidence-based guidelines published by the [facility] ([facility]) when recommending preventive services for our patients. Because we follow these guidelines, sometimes recommendations change over time as research supports it. (For example, mammograms used to be recommended annually. Even though Medicare will still pay for an annual mammogram, the newer guidelines recommend a mammogram every two years for women of average risk). Of course, you and your doctor may decide to screen more often for some diseases, based on your risk and your co-morbidities (chronic disease you are already diagnosed with). Preventive services for you include:  - Medicare offers their members a free annual wellness visit, which is time for you and your primary care provider to discuss and plan for your preventive service needs. Take advantage of this benefit every year!  -All adults over the age of 72 should receive the recommended pneumonia vaccines. Current USPSTF guidelines recommend a series of two vaccines for the best pneumonia protection.   -All adults should have a flu vaccine yearly and a tetanus vaccine every 10 years.   -All adults age 48 and older should receive the shingles vaccines (series of two vaccines).       -All adults age 38-68 who are overweight should have a diabetes screening test once every three years.   -All adults born between [birth year] and 1965 should be screened once for Hepatitis C.  -Other screening tests and preventive services for persons with diabetes include: an eye exam to screen for diabetic retinopathy, a kidney function test, a foot exam, and stricter control over your cholesterol.   -Cardiovascular screening for adults with routine risk involves an electrocardiogram (ECG) at intervals determined by your doctor.   -Colorectal cancer screenings should be done for adults age 54-65 with no increased risk factors for colorectal cancer. There are a number of acceptable methods of screening for this type of cancer. Each test has its own benefits and drawbacks. Discuss with your doctor what is most appropriate for you during your annual wellness visit. The different tests include: colonoscopy (considered the best screening method), a fecal occult blood test, a fecal DNA test, and sigmoidoscopy.    -A bone mass density test is recommended when a woman turns 65 to screen for osteoporosis. This test is only recommended one time, as a screening. Some providers will use this same test as a disease monitoring tool if you already have osteoporosis. -Breast cancer screenings are recommended every other year for women of normal risk, age 54-69.  -Cervical cancer screenings for women over age 72 are only recommended with certain risk factors. Here is a list of your current Health Maintenance items (your personalized list of preventive services) with a due date: There are no preventive care reminders to display for this patient.

## 2022-07-19 NOTE — PROGRESS NOTES
This is the Subsequent Medicare Annual Wellness Exam, performed 12 months or more after the Initial AWV or the last Subsequent AWV    I have reviewed the patient's medical history in detail and updated the computerized patient record. BP (!) 152/79 (BP 1 Location: Left upper arm, BP Patient Position: Sitting, BP Cuff Size: Large adult)   Pulse 61   Temp 97 °F (36.1 °C) (Temporal)   Resp 16   Ht 4' 11\" (1.499 m)   Wt 171 lb (77.6 kg)   SpO2 98%   BMI 34.54 kg/m²         Assessment/Plan   Education and counseling provided:  Are appropriate based on today's review and evaluation    1. Medicare annual wellness visit, subsequent       Depression Risk Factor Screening     3 most recent PHQ Screens 7/19/2022   Little interest or pleasure in doing things Not at all   Feeling down, depressed, irritable, or hopeless Not at all   Total Score PHQ 2 0       Alcohol & Drug Abuse Risk Screen    Do you average more than 1 drink per night or more than 7 drinks a week:  No    On any one occasion in the past three months have you have had more than 3 drinks containing alcohol:  No          Functional Ability and Level of Safety    Hearing: Hearing is good. Activities of Daily Living: The home contains: no safety equipment. and toilet chair  Patient needs help with:  transportation and walking      Ambulation: with difficulty, uses a cane     Fall Risk:  Fall Risk Assessment, last 12 mths 7/19/2022   Able to walk? Yes   Fall in past 12 months? 1   Do you feel unsteady? 0   Are you worried about falling 0   Is TUG test greater than 12 seconds? 0   Is the gait abnormal? 1   Number of falls in past 12 months 1   Fall with injury? 0      Abuse Screen:  Patient is not abused       Cognitive Screening    Has your family/caregiver stated any concerns about your memory: yes - afamily     Cognitive Screening: Abnormal - Animal Naming Test, Mini Cog Test (only 9 animals named.  Misses all the objects)    Health Maintenance Due There are no preventive care reminders to display for this patient. Patient Care Team   Patient Care Team:  Pierre Easton MD as PCP - General (Internal Medicine Physician)  Pierre Easton MD as PCP - [facility] Empaneled Provider  Benny Sandra MD as Consulting Provider (Ophthalmology)  Micah Seo MD as Consulting Provider (Gastroenterology)  Juli Ward MD (Cardiovascular Disease Physician)    History     Patient Active Problem List   Diagnosis Code    Hypercholesteremia E78.00    Essential hypertension I10    Severe obesity (BMI 35.0-39. 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 Gena Thorpe. [place]    HX HYSTERECTOMY       Current Outpatient Medications   Medication Sig Dispense Refill    furosemide (LASIX) 20 mg tablet Take one tablet by mouth in the morning as needed for swelling of the feet  Indications: visible water retention, high blood pressure 90 Tablet 1    amLODIPine-valsartan (EXFORGE) 5-320 mg per tablet TAKE 1 TABLET BY MOUTH EVERY DAY 90 Tablet 5    loteprednol etabonate 0.5 % drpg Apply 1 Each to eye daily. One drop to left eye daily for inflammation      aspirin delayed-release 81 mg tablet Take 81 mg by mouth daily.  clopidogreL (Plavix) 75 mg tab Take 1 Tablet by mouth daily. (Patient not taking: Reported on 7/19/2022) 90 Tablet 3    acetaminophen (TylenoL) 325 mg tablet Take 650 mg by mouth every four (4) hours as needed for Pain. (Patient not taking: Reported on 7/19/2022)       Allergies   Allergen Reactions    Penicillins Other (comments)     Passed out      Statins-Hmg-Coa Reductase Inhibitors Myalgia     Tried multiple statins       Family History   Problem Relation Age of Onset    Stroke Mother 61    Other Father         fire at job    Cancer Brother     Other Brother         blood clots    Cancer Daughter      Social History     Tobacco Use    Smoking status: Former Smoker     Packs/day: 0.25     Years: 5.00     Pack years: 1.25     Types: Cigarettes    Smokeless tobacco: Never Used    Tobacco comment: a pack lasted several months   Substance Use Topics    Alcohol use: No     Alcohol/week: 0.0 standard drinks         Onur Wolfe MD

## 2022-07-19 NOTE — PROGRESS NOTES
Reviewed record in preparation for visit and have obtained necessary documentation. Maurizio Batista is a 80 y.o.  female presents today for office visit for   Chief Complaint   Patient presents with    Annual Wellness Visit    Cholesterol Problem    Hypertension   Pt is  fasting. Patient is accompanied by daughter. I have received verbal consent from Maurizio Batista to discuss any/all medical information while they are present in the room. Pt is in Room # [address] preferred language for health care discussion is english/other. Is the patient using any DME equipment during OV? YES    Advance Directive:  1. Do you have an advance directive in place? Patient Reply: NO    2. If not, would you like material regarding how to put one in place? NO      1. \"Have you been to the ER, urgent care clinic since your last visit? Hospitalized since your last visit? \" No    2. \"Have you seen or consulted any other health care providers outside of the [address] [place] [address] since your last visit? \" No     3. For patients aged 39-70: Has the patient had a colonoscopy? NA - based on age     If the patient is female:    4. For patients aged 41-77: Has the patient had a mammogram within the past 2 years? NA - based on age    11. For patients aged 21-65: Has the patient had a pap smear? NA - based on age    Upcoming Appts  No    VORB: No orders of the defined types were placed in this encounter.  Linda Jang MD/Cate Tam LPN    Maurizio Batista is due for:   Health Maintenance Due   Topic    Medicare Yearly Exam      Health Maintenance reviewed and discussed per provider  Please order/place referral if appropriate.     Requested Prescriptions      No prescriptions requested or ordered in this encounter       Learning Assessment:  Learning Assessment 8/1/2016   PRIMARY LEARNER Patient   HIGHEST LEVEL OF EDUCATION - PRIMARY LEARNER  GRADUATED HIGH SCHOOL OR GED   PRIMARY LANGUAGE ENGLISH LEARNER PREFERENCE PRIMARY DEMONSTRATION   ANSWERED BY MARGIE Robbins   RELATIONSHIP SELF     Depression Screening:  3 most recent [facility] [address] Screens 7/19/2022 4/19/2022 1/18/2022 5/5/2021 4/15/2021 3/25/2021 2/27/2020   Little interest or pleasure in doing things Not at all Not at all Not at all Not at all Not at all Not at all Not at all   Feeling down, depressed, irritable, or hopeless Not at all Not at all Not at all Not at all Not at all Not at all Not at all   Total Score PHQ 2 0 0 0 0 0 0 0     Abuse Screening:  Abuse Screening Questionnaire 7/19/2022 5/5/2021 2/27/2020 11/28/2017 9/28/2017 8/1/2016   Do you ever feel afraid of your partner? N N N N N N   Are you in a relationship with someone who physically or mentally threatens you? N N N N N N   Is it safe for you to go home? Tsering DELGADO     Fall Risk  Fall Risk Assessment, last 12 mths 7/19/2022 5/5/2021 4/15/2021 3/25/2021 2/27/2020 11/26/2019 4/29/2019   Able to walk? Yes Yes Yes Yes Yes Yes Yes   Fall in past 12 months? 1 1 1 1 No No No   Do you feel unsteady? 0 0 0 - - - -   Are you worried about falling 0 0 1 - - - -   Is TUG test greater than 12 seconds? 0 0 0 - - - -   Is the gait abnormal? 1 0 1 - - - -   Number of falls in past 12 months 1 2 2 1 - - -   Fall with injury?  0 1 1 1 - - -     Recent Travel Screening and Travel History documentation     Travel Screening     No screening recorded since 07/18/22 0000     Travel History   Travel since 06/19/22    No documented travel since 06/19/22

## 2022-10-20 ENCOUNTER — OFFICE VISIT (OUTPATIENT)
Dept: INTERNAL MEDICINE CLINIC | Age: 87
End: 2022-10-20
Payer: MEDICARE

## 2022-10-20 VITALS
WEIGHT: 168 LBS | HEIGHT: 59 IN | OXYGEN SATURATION: 93 % | TEMPERATURE: 97.2 F | RESPIRATION RATE: 17 BRPM | BODY MASS INDEX: 33.87 KG/M2 | HEART RATE: 70 BPM | DIASTOLIC BLOOD PRESSURE: 75 MMHG | SYSTOLIC BLOOD PRESSURE: 178 MMHG

## 2022-10-20 DIAGNOSIS — I10 ESSENTIAL HYPERTENSION: Primary | ICD-10-CM

## 2022-10-20 DIAGNOSIS — E78.00 HYPERCHOLESTEREMIA: ICD-10-CM

## 2022-10-20 PROCEDURE — G8427 DOCREV CUR MEDS BY ELIG CLIN: HCPCS | Performed by: INTERNAL MEDICINE

## 2022-10-20 PROCEDURE — 1123F ACP DISCUSS/DSCN MKR DOCD: CPT | Performed by: INTERNAL MEDICINE

## 2022-10-20 PROCEDURE — 99213 OFFICE O/P EST LOW 20 MIN: CPT | Performed by: INTERNAL MEDICINE

## 2022-10-20 PROCEDURE — G8417 CALC BMI ABV UP PARAM F/U: HCPCS | Performed by: INTERNAL MEDICINE

## 2022-10-20 PROCEDURE — 1090F PRES/ABSN URINE INCON ASSESS: CPT | Performed by: INTERNAL MEDICINE

## 2022-10-20 PROCEDURE — 1101F PT FALLS ASSESS-DOCD LE1/YR: CPT | Performed by: INTERNAL MEDICINE

## 2022-10-20 PROCEDURE — G8536 NO DOC ELDER MAL SCRN: HCPCS | Performed by: INTERNAL MEDICINE

## 2022-10-20 PROCEDURE — G8510 SCR DEP NEG, NO PLAN REQD: HCPCS | Performed by: INTERNAL MEDICINE

## 2022-10-20 NOTE — PROGRESS NOTES
1. \"Have you been to the ER, urgent care clinic since your last visit? Hospitalized since your last visit? \" No    2. \"Have you seen or consulted any other health care providers outside of the [address] [place] [address] since your last visit? \" No     3. For patients aged 39-70: Has the patient had a colonoscopy / FIT/ Cologuard? NA - based on age      If the patient is female:    4. For patients aged 41-77: Has the patient had a mammogram within the past 2 years? NA - based on age or sex      11. For patients aged 21-65: Has the patient had a pap smear? NA - based on age or sex    This patient is accompanied in the office by her daughter.

## 2022-10-20 NOTE — PROGRESS NOTES
HISTORY OF PRESENT ILLNESS  Lynn Belcher is a [address] y.o. female. BP (!) 178/75 (BP 1 Location: Left lower arm, BP Patient Position: Sitting, BP Cuff Size: Large adult) Comment: w/o bp med  Pulse 70   Temp 97.2 °F (36.2 °C) (Temporal)   Resp 17   Ht 4' 11\" (1.499 m)   Wt 168 lb (76.2 kg)   SpO2 93%   BMI 33.93 kg/m²         She forgot her BP meds today! She has new shoes when she has not gotten used to and they are restricting her walk some          Current Outpatient Medications:   ·  furosemide (LASIX) 20 mg tablet, Take one tablet by mouth in the morning as needed for swelling of the feet  Indications: visible water retention, high blood pressure, Disp: 90 Tablet, Rfl: 1  ·  amLODIPine-valsartan (EXFORGE) 5-320 mg per tablet, TAKE 1 TABLET BY MOUTH EVERY DAY, Disp: 90 Tablet, Rfl: 5  ·  aspirin delayed-release 81 mg tablet, Take 81 mg by mouth daily. , Disp: , Rfl:   ·  clopidogreL (Plavix) 75 mg tab, Take 1 Tablet by mouth daily. (Patient not taking: No sig reported), Disp: 90 Tablet, Rfl: 3  ·  loteprednol etabonate 0.5 % drpg, Apply 1 Each to eye daily. One drop to left eye daily for inflammation (Patient not taking: Reported on 10/20/2022), Disp: , Rfl:   ·  acetaminophen (TYLENOL) 325 mg tablet, Take 650 mg by mouth every four (4) hours as needed for Pain. (Patient not taking: No sig reported), Disp: , Rfl:       Hypertension   The history is provided by the Patient. This is a chronic problem. The current episode started more than 1 week ago. Pertinent negatives include no chest pain, no palpitations and no shortness of breath. Risk factors include dyslipidemia. Cholesterol Problem  The history is provided by the Patient. This is a chronic problem. The current episode started more than 1 week ago. The problem occurs daily. Pertinent negatives include no chest pain and no shortness of breath. Review of Systems   Constitutional:  Negative for chills and fever.    Respiratory:  Negative for shortness of breath. Cardiovascular:  Negative for chest pain and palpitations. Musculoskeletal:  Positive for joint pain. Physical Exam  Vitals and nursing note reviewed. Constitutional:       Appearance: Normal appearance. She is well-developed. HENT:      Head: Normocephalic and atraumatic. Cardiovascular:      Rate and Rhythm: Normal rate and regular rhythm. Pulmonary:      Effort: Pulmonary effort is normal.      Breath sounds: Normal breath sounds. Musculoskeletal:      Right lower leg: Edema (trace) present. Left lower leg: Edema (trace) present. Skin:     General: Skin is warm and dry. Neurological:      General: No focal deficit present. Mental Status: She is alert and oriented to person, place, and time. Psychiatric:         Mood and Affect: Mood normal.         Behavior: Behavior normal.       ASSESSMENT and PLAN    ICD-10-CM ICD-9-CM    1. Essential hypertension  I10 401.9       2. Hypercholesteremia  E78.00 272.0           BP up today. She will take her meds when she gets home. Emphasized the importance of not missing her meds. Advised her of stroke risk as well especially at this high a BP. Reviewed most recent cholesterol. Given her age, recommend no longer checking this as little impact of therapy   Advised to take her plavix  F/u 3 months for BP recheck.

## 2023-01-20 ENCOUNTER — HOSPITAL ENCOUNTER (OUTPATIENT)
Dept: LAB | Age: 88
End: 2023-01-20
Payer: MEDICARE

## 2023-01-20 ENCOUNTER — OFFICE VISIT (OUTPATIENT)
Dept: INTERNAL MEDICINE CLINIC | Age: 88
End: 2023-01-20
Payer: MEDICARE

## 2023-01-20 VITALS
BODY MASS INDEX: 35.88 KG/M2 | HEART RATE: 63 BPM | HEIGHT: 59 IN | WEIGHT: 178 LBS | RESPIRATION RATE: 14 BRPM | TEMPERATURE: 96.8 F | SYSTOLIC BLOOD PRESSURE: 156 MMHG | DIASTOLIC BLOOD PRESSURE: 84 MMHG

## 2023-01-20 DIAGNOSIS — I10 ESSENTIAL (PRIMARY) HYPERTENSION: ICD-10-CM

## 2023-01-20 DIAGNOSIS — D64.9 ANEMIA, UNSPECIFIED TYPE: ICD-10-CM

## 2023-01-20 DIAGNOSIS — Z76.0 MEDICATION REFILL: ICD-10-CM

## 2023-01-20 DIAGNOSIS — I10 ESSENTIAL (PRIMARY) HYPERTENSION: Primary | ICD-10-CM

## 2023-01-20 DIAGNOSIS — E66.01 SEVERE OBESITY (BMI 35.0-39.9) WITH COMORBIDITY (HCC): ICD-10-CM

## 2023-01-20 LAB
ANION GAP SERPL CALC-SCNC: 5 MMOL/L (ref 3–18)
BASOPHILS # BLD: 0 K/UL (ref 0–0.1)
BASOPHILS NFR BLD: 1 % (ref 0–2)
BUN SERPL-MCNC: 16 MG/DL (ref 7–18)
BUN/CREAT SERPL: 20 (ref 12–20)
CALCIUM SERPL-MCNC: 9 MG/DL (ref 8.5–10.1)
CHLORIDE SERPL-SCNC: 105 MMOL/L (ref 100–111)
CO2 SERPL-SCNC: 28 MMOL/L (ref 21–32)
CREAT SERPL-MCNC: 0.82 MG/DL (ref 0.6–1.3)
DIFFERENTIAL METHOD BLD: ABNORMAL
EOSINOPHIL # BLD: 0 K/UL (ref 0–0.4)
EOSINOPHIL NFR BLD: 1 % (ref 0–5)
ERYTHROCYTE [DISTWIDTH] IN BLOOD BY AUTOMATED COUNT: 14.5 % (ref 11.6–14.5)
GLUCOSE SERPL-MCNC: 80 MG/DL (ref 74–99)
HCT VFR BLD AUTO: 33.5 % (ref 35–45)
HGB BLD-MCNC: 10.4 G/DL (ref 12–16)
IMM GRANULOCYTES # BLD AUTO: 0 K/UL (ref 0–0.04)
IMM GRANULOCYTES NFR BLD AUTO: 0 % (ref 0–0.5)
IRON SATN MFR SERPL: 26 % (ref 20–50)
IRON SERPL-MCNC: 85 UG/DL (ref 50–175)
LYMPHOCYTES # BLD: 1.8 K/UL (ref 0.9–3.6)
LYMPHOCYTES NFR BLD: 32 % (ref 21–52)
MCH RBC QN AUTO: 29.7 PG (ref 24–34)
MCHC RBC AUTO-ENTMCNC: 31 G/DL (ref 31–37)
MCV RBC AUTO: 95.7 FL (ref 78–100)
MONOCYTES # BLD: 0.5 K/UL (ref 0.05–1.2)
MONOCYTES NFR BLD: 10 % (ref 3–10)
NEUTS SEG # BLD: 3.2 K/UL (ref 1.8–8)
NEUTS SEG NFR BLD: 57 % (ref 40–73)
NRBC # BLD: 0 K/UL (ref 0–0.01)
NRBC BLD-RTO: 0 PER 100 WBC
PLATELET # BLD AUTO: 291 K/UL (ref 135–420)
PMV BLD AUTO: 10.2 FL (ref 9.2–11.8)
POTASSIUM SERPL-SCNC: 4.1 MMOL/L (ref 3.5–5.5)
RBC # BLD AUTO: 3.5 M/UL (ref 4.2–5.3)
SODIUM SERPL-SCNC: 138 MMOL/L (ref 136–145)
TIBC SERPL-MCNC: 326 UG/DL (ref 250–450)
WBC # BLD AUTO: 5.6 K/UL (ref 4.6–13.2)

## 2023-01-20 PROCEDURE — 83540 ASSAY OF IRON: CPT

## 2023-01-20 PROCEDURE — 85025 COMPLETE CBC W/AUTO DIFF WBC: CPT

## 2023-01-20 PROCEDURE — 36415 COLL VENOUS BLD VENIPUNCTURE: CPT

## 2023-01-20 PROCEDURE — 80048 BASIC METABOLIC PNL TOTAL CA: CPT

## 2023-01-20 RX ORDER — CLOPIDOGREL BISULFATE 75 MG/1
75 TABLET ORAL DAILY
Qty: 90 TABLET | Refills: 3 | Status: SHIPPED | OUTPATIENT
Start: 2023-01-20

## 2023-01-20 RX ORDER — AMLODIPINE AND VALSARTAN 5; 320 MG/1; MG/1
1 TABLET ORAL DAILY
Qty: 90 TABLET | Refills: 5 | Status: SHIPPED | OUTPATIENT
Start: 2023-01-20

## 2023-01-20 NOTE — PROGRESS NOTES
HISTORY OF PRESENT ILLNESS  Vianney Stuart is a [address] y.[address] female. BP (!) 156/84 (BP 1 Location: Left lower arm) Comment (BP 1 Location): manual cuff  Pulse 63   Temp 96.8 °F (36 °C) (Temporal)   Resp 14   Ht 4' 11\" (1.499 m)   Wt 178 lb (80.7 kg)   BMI 35.95 kg/m²       Pt here with daughter for BP check. She is always high here but she insists she is taking her meds    Hypertension   The history is provided by the Patient. This is a chronic problem. The current episode started more than 1 week ago. The problem has not changed since onset. Pertinent negatives include no chest pain, no palpitations, no dizziness and no shortness of breath. Review of Systems   Constitutional:  Negative for chills and fever. Respiratory:  Negative for shortness of breath. Cardiovascular:  Negative for chest pain and palpitations. Neurological:  Negative for dizziness. Physical Exam  Vitals and nursing note reviewed. Constitutional:       Appearance: Normal appearance. She is well-developed. HENT:      Head: Normocephalic and atraumatic. Cardiovascular:      Rate and Rhythm: Normal rate and regular rhythm. Comments: Her heart does not sound stiff as usually with high blood pressure. The is no pounding quality  Pulmonary:      Effort: Pulmonary effort is normal.      Breath sounds: Normal breath sounds. Skin:     General: Skin is warm and dry. Neurological:      General: No focal deficit present. Mental Status: She is alert and oriented to person, place, and time. Psychiatric:         Mood and Affect: Mood normal.         Behavior: Behavior normal.       ASSESSMENT and PLAN    ICD-10-CM ICD-9-CM    1. Essential (primary) hypertension  I10 401.9 amLODIPine-valsartan (EXFORGE) 5-320 mg per tablet      METABOLIC PANEL, BASIC      2. Anemia, unspecified type  D64.9 285.9 CBC WITH AUTOMATED DIFF      IRON PROFILE      3. Severe obesity (BMI 35.0-39. 9) with comorbidity ([facility] [address]  E66.01 278.01       4. Medication refill  Z76.0 V68.1 clopidogreL (Plavix) 75 mg tab      amLODIPine-valsartan (EXFORGE) 5-320 mg per tablet          BP still up, on multiple meds (3 meds). We are reluctant to add any more meds at her age. Her manual readings are much better than the automated readings  Discussed weight briefly. Up 10# since October. She will work at it. She has been eating \"too many cookies. \" Weight loss will help the BP as well  Refill as noted  Update lab  F/u 6 months for MWV, BP, anemia

## 2023-01-20 NOTE — PROGRESS NOTES
1. \"Have you been to the ER, urgent care clinic since your last visit? Hospitalized since your last visit? \" No    2. \"Have you seen or consulted any other health care providers outside of the [address] [place] [address] since your last visit? \" No     3. For patients aged 39-70: Has the patient had a colonoscopy / FIT/ Cologuard? NA - based on age      If the patient is female:    4. For patients aged 41-77: Has the patient had a mammogram within the past 2 years? NA - based on age or sex      11. For patients aged 21-65: Has the patient had a pap smear? NA - based on age or sex    This patient is accompanied in the office by her daughter.

## 2023-07-20 ENCOUNTER — OFFICE VISIT (OUTPATIENT)
Facility: CLINIC | Age: 88
End: 2023-07-20
Payer: MEDICARE

## 2023-07-20 VITALS
TEMPERATURE: 97.9 F | HEART RATE: 67 BPM | SYSTOLIC BLOOD PRESSURE: 136 MMHG | WEIGHT: 177.8 LBS | RESPIRATION RATE: 15 BRPM | DIASTOLIC BLOOD PRESSURE: 66 MMHG | BODY MASS INDEX: 35.84 KG/M2 | OXYGEN SATURATION: 99 % | HEIGHT: 59 IN

## 2023-07-20 DIAGNOSIS — E78.00 PURE HYPERCHOLESTEROLEMIA, UNSPECIFIED: ICD-10-CM

## 2023-07-20 DIAGNOSIS — Z00.00 MEDICARE ANNUAL WELLNESS VISIT, SUBSEQUENT: Primary | ICD-10-CM

## 2023-07-20 DIAGNOSIS — I10 ESSENTIAL (PRIMARY) HYPERTENSION: ICD-10-CM

## 2023-07-20 DIAGNOSIS — R60.0 LOCALIZED EDEMA: ICD-10-CM

## 2023-07-20 PROCEDURE — G0439 PPPS, SUBSEQ VISIT: HCPCS | Performed by: INTERNAL MEDICINE

## 2023-07-20 PROCEDURE — 1123F ACP DISCUSS/DSCN MKR DOCD: CPT | Performed by: INTERNAL MEDICINE

## 2023-07-20 RX ORDER — AMLODIPINE AND VALSARTAN 5; 320 MG/1; MG/1
1 TABLET ORAL DAILY
Qty: 90 TABLET | Refills: 3 | Status: SHIPPED | OUTPATIENT
Start: 2023-07-20

## 2023-07-20 RX ORDER — CLOPIDOGREL BISULFATE 75 MG/1
75 TABLET ORAL DAILY
Qty: 90 TABLET | Refills: 3 | Status: SHIPPED | OUTPATIENT
Start: 2023-07-20

## 2023-07-20 RX ORDER — ASPIRIN 81 MG/1
81 TABLET ORAL DAILY
Qty: 90 TABLET | Refills: 3 | Status: SHIPPED | OUTPATIENT
Start: 2023-07-20

## 2023-07-20 SDOH — ECONOMIC STABILITY: HOUSING INSECURITY
IN THE LAST 12 MONTHS, WAS THERE A TIME WHEN YOU DID NOT HAVE A STEADY PLACE TO SLEEP OR SLEPT IN A SHELTER (INCLUDING NOW)?: NO

## 2023-07-20 SDOH — ECONOMIC STABILITY: FOOD INSECURITY: WITHIN THE PAST 12 MONTHS, THE FOOD YOU BOUGHT JUST DIDN'T LAST AND YOU DIDN'T HAVE MONEY TO GET MORE.: NEVER TRUE

## 2023-07-20 SDOH — ECONOMIC STABILITY: INCOME INSECURITY: HOW HARD IS IT FOR YOU TO PAY FOR THE VERY BASICS LIKE FOOD, HOUSING, MEDICAL CARE, AND HEATING?: NOT HARD AT ALL

## 2023-07-20 SDOH — ECONOMIC STABILITY: FOOD INSECURITY: WITHIN THE PAST 12 MONTHS, YOU WORRIED THAT YOUR FOOD WOULD RUN OUT BEFORE YOU GOT MONEY TO BUY MORE.: NEVER TRUE

## 2023-07-20 ASSESSMENT — PATIENT HEALTH QUESTIONNAIRE - PHQ9
SUM OF ALL RESPONSES TO PHQ QUESTIONS 1-9: 0
SUM OF ALL RESPONSES TO PHQ9 QUESTIONS 1 & 2: 0
SUM OF ALL RESPONSES TO PHQ QUESTIONS 1-9: 0
SUM OF ALL RESPONSES TO PHQ QUESTIONS 1-9: 0
2. FEELING DOWN, DEPRESSED OR HOPELESS: 0
SUM OF ALL RESPONSES TO PHQ QUESTIONS 1-9: 0
1. LITTLE INTEREST OR PLEASURE IN DOING THINGS: 0

## 2023-07-20 ASSESSMENT — LIFESTYLE VARIABLES
HOW MANY STANDARD DRINKS CONTAINING ALCOHOL DO YOU HAVE ON A TYPICAL DAY: PATIENT DOES NOT DRINK
HOW OFTEN DO YOU HAVE A DRINK CONTAINING ALCOHOL: NEVER

## 2023-07-20 NOTE — PROGRESS NOTES
needed for swelling of the feet  Indications: visible water retention, high blood pressure Yes Ar Automatic Reconciliation   acetaminophen (TYLENOL) 325 MG tablet Take 650 mg by mouth every 4 hours as needed  Patient not taking: Reported on 7/20/2023  Ar Automatic Reconciliation   loteprednol (LOTEMAX) 0.5 % ophthalmic gel Apply 1 each to eye daily  Patient not taking: Reported on 7/20/2023  Ar Automatic Reconciliation       CareTeam (Including outside providers/suppliers regularly involved in providing care):   Patient Care Team:  Lalitha Rae MD as PCP - Elsy Hammonds MD as PCP - Empaneled Provider  Stiven Jewell MD as Consulting Physician  Sonny Jauregui MD as Consulting Physician     Reviewed and updated this visit:  Tobacco  Allergies  Med Hx  Surg Hx  Soc Hx  Fam Hx

## 2023-10-26 ENCOUNTER — TELEPHONE (OUTPATIENT)
Facility: CLINIC | Age: 88
End: 2023-10-26

## 2023-10-26 NOTE — TELEPHONE ENCOUNTER
Spoke with pt in regards to possible head injury. Two patient identifier's verified. Pt states she has not had a new head injury and states she was calling because of her dizziness. Pt states she went to [facility] ED yesterday. Pt stated her dizziness has resolved since the ER follow up. Pt request a follow up appointment and is scheduled to be seen 10/31/2023.

## 2023-10-26 NOTE — TELEPHONE ENCOUNTER
----- Message from [facility] sent at 10/25/2023 10:56 AM EDT -----  Subject: Message to Provider    QUESTIONS  Information for Provider? 1. Patient refusal for RN Triage - Pt hung up 2. [employer] or [employer] Question answered- yes to injury to head 3. Onset of symptoms, and -Headache, memory loss, nose bleed 4. Any other   relevant information  ---------------------------------------------------------------------------  --------------  Jamal KILGORE  [phone]; OK to leave message on voicemail  ---------------------------------------------------------------------------  --------------  SCRIPT ANSWERS  Relationship to Patient?  Self

## 2023-11-07 ENCOUNTER — OFFICE VISIT (OUTPATIENT)
Facility: CLINIC | Age: 88
End: 2023-11-07
Payer: MEDICARE

## 2023-11-07 VITALS
BODY MASS INDEX: 35.08 KG/M2 | HEART RATE: 66 BPM | TEMPERATURE: 96.7 F | OXYGEN SATURATION: 97 % | HEIGHT: 59 IN | DIASTOLIC BLOOD PRESSURE: 72 MMHG | SYSTOLIC BLOOD PRESSURE: 148 MMHG | RESPIRATION RATE: 17 BRPM | WEIGHT: 174 LBS

## 2023-11-07 DIAGNOSIS — R42 DIZZINESS: Primary | ICD-10-CM

## 2023-11-07 DIAGNOSIS — R60.0 LOCALIZED EDEMA: ICD-10-CM

## 2023-11-07 DIAGNOSIS — I10 ESSENTIAL (PRIMARY) HYPERTENSION: ICD-10-CM

## 2023-11-07 PROCEDURE — G8417 CALC BMI ABV UP PARAM F/U: HCPCS | Performed by: INTERNAL MEDICINE

## 2023-11-07 PROCEDURE — 1036F TOBACCO NON-USER: CPT | Performed by: INTERNAL MEDICINE

## 2023-11-07 PROCEDURE — 1090F PRES/ABSN URINE INCON ASSESS: CPT | Performed by: INTERNAL MEDICINE

## 2023-11-07 PROCEDURE — 99214 OFFICE O/P EST MOD 30 MIN: CPT | Performed by: INTERNAL MEDICINE

## 2023-11-07 PROCEDURE — G8428 CUR MEDS NOT DOCUMENT: HCPCS | Performed by: INTERNAL MEDICINE

## 2023-11-07 PROCEDURE — 1123F ACP DISCUSS/DSCN MKR DOCD: CPT | Performed by: INTERNAL MEDICINE

## 2023-11-07 PROCEDURE — G8484 FLU IMMUNIZE NO ADMIN: HCPCS | Performed by: INTERNAL MEDICINE

## 2023-11-07 RX ORDER — MECLIZINE HCL 12.5 MG/1
6.25 TABLET ORAL DAILY PRN
COMMUNITY
Start: 2023-10-26 | End: 2023-11-07 | Stop reason: SDUPTHER

## 2023-11-07 RX ORDER — MECLIZINE HCL 12.5 MG/1
6.25 TABLET ORAL DAILY PRN
Qty: 45 TABLET | Refills: 2 | Status: SHIPPED | OUTPATIENT
Start: 2023-11-07

## 2023-11-07 ASSESSMENT — ENCOUNTER SYMPTOMS: SHORTNESS OF BREATH: 0

## 2023-11-07 NOTE — PROGRESS NOTES
1. \"Have you been to the ER, urgent care clinic since your last visit? Hospitalized since your last visit? \" Yes Ritesh Fidel Dizziness, 10/25    2. \"Have you seen or consulted any other health care providers outside of the [address] [place] [address] since your last visit? \" No     3. For patients aged 43-73: Has the patient had a colonoscopy / FIT/ Cologuard? NA - based on age      If the patient is female:    4. For patients aged 43-66: Has the patient had a mammogram within the past 2 years? NA - based on age or sex      11. For patients aged 21-65: Has the patient had a pap smear? NA - based on age or sex     This patient is accompanied in the office by her daughter.

## 2024-03-07 ENCOUNTER — HOSPITAL ENCOUNTER (OUTPATIENT)
Facility: HOSPITAL | Age: 89
Setting detail: SPECIMEN
Discharge: HOME | End: 2024-03-07
Payer: MEDICARE

## 2024-03-07 ENCOUNTER — OFFICE VISIT (OUTPATIENT)
Facility: CLINIC | Age: 89
End: 2024-03-07
Payer: MEDICARE

## 2024-03-07 VITALS
WEIGHT: 174 LBS | RESPIRATION RATE: 15 BRPM | OXYGEN SATURATION: 95 % | BODY MASS INDEX: 35.08 KG/M2 | HEIGHT: 59 IN | HEART RATE: 71 BPM | SYSTOLIC BLOOD PRESSURE: 150 MMHG | DIASTOLIC BLOOD PRESSURE: 58 MMHG | TEMPERATURE: 96.8 F

## 2024-03-07 DIAGNOSIS — Z91.81 AT HIGH RISK FOR FALLS: ICD-10-CM

## 2024-03-07 DIAGNOSIS — R60.0 LOCALIZED EDEMA: ICD-10-CM

## 2024-03-07 DIAGNOSIS — E78.00 PURE HYPERCHOLESTEROLEMIA, UNSPECIFIED: ICD-10-CM

## 2024-03-07 DIAGNOSIS — I10 ESSENTIAL (PRIMARY) HYPERTENSION: ICD-10-CM
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
## 2024-03-07 DIAGNOSIS — I10 ESSENTIAL (PRIMARY) HYPERTENSION: Primary | ICD-10-CM

## 2024-03-07 DIAGNOSIS — D64.9 ANEMIA, UNSPECIFIED TYPE: ICD-10-CM

## 2024-03-07 DIAGNOSIS — E66.01 SEVERE OBESITY (BMI 35.0-39.9) WITH COMORBIDITY (HCC): ICD-10-CM

## 2024-03-07 LAB
ANION GAP SERPL CALC-SCNC: 6 MMOL/L (ref 3–18)
BASOPHILS # BLD: 0 K/UL (ref 0–0.1)
BASOPHILS NFR BLD: 1 % (ref 0–2)
BUN SERPL-MCNC: 29 MG/DL (ref 7–18)
BUN/CREAT SERPL: 28 (ref 12–20)
CALCIUM SERPL-MCNC: 9.1 MG/DL (ref 8.5–10.1)
CHLORIDE SERPL-SCNC: 108 MMOL/L (ref 100–111)
CO2 SERPL-SCNC: 25 MMOL/L (ref 21–32)
CREAT SERPL-MCNC: 1.05 MG/DL (ref 0.6–1.3)
DIFFERENTIAL METHOD BLD: ABNORMAL
EOSINOPHIL # BLD: 0 K/UL (ref 0–0.4)
EOSINOPHIL NFR BLD: 1 % (ref 0–5)
ERYTHROCYTE [DISTWIDTH] IN BLOOD BY AUTOMATED COUNT: 13.8 % (ref 11.6–14.5)
GLUCOSE SERPL-MCNC: 77 MG/DL (ref 74–99)
HCT VFR BLD AUTO: 32.5 % (ref 35–45)
HGB BLD-MCNC: 10.4 G/DL (ref 12–16)
IMM GRANULOCYTES # BLD AUTO: 0 K/UL (ref 0–0.04)
IMM GRANULOCYTES NFR BLD AUTO: 1 % (ref 0–0.5)
LYMPHOCYTES # BLD: 1.6 K/UL (ref 0.9–3.6)
LYMPHOCYTES NFR BLD: 26 % (ref 21–52)
MCH RBC QN AUTO: 30.5 PG (ref 24–34)
MCHC RBC AUTO-ENTMCNC: 32 G/DL (ref 31–37)
MCV RBC AUTO: 95.3 FL (ref 78–100)
MONOCYTES # BLD: 0.6 K/UL (ref 0.05–1.2)
MONOCYTES NFR BLD: 9 % (ref 3–10)
NEUTS SEG # BLD: 3.9 K/UL (ref 1.8–8)
NEUTS SEG NFR BLD: 63 % (ref 40–73)
NRBC # BLD: 0 K/UL (ref 0–0.01)
NRBC BLD-RTO: 0 PER 100 WBC
PLATELET # BLD AUTO: 303 K/UL (ref 135–420)
PMV BLD AUTO: 10.7 FL (ref 9.2–11.8)
POTASSIUM SERPL-SCNC: 4.6 MMOL/L (ref 3.5–5.5)
RBC # BLD AUTO: 3.41 M/UL (ref 4.2–5.3)
SODIUM SERPL-SCNC: 139 MMOL/L (ref 136–145)
WBC # BLD AUTO: 6.2 K/UL (ref 4.6–13.2)

## 2024-03-07 PROCEDURE — 1036F TOBACCO NON-USER: CPT | Performed by: INTERNAL MEDICINE

## 2024-03-07 PROCEDURE — G8417 CALC BMI ABV UP PARAM F/U: HCPCS | Performed by: INTERNAL MEDICINE

## 2024-03-07 PROCEDURE — 36415 COLL VENOUS BLD VENIPUNCTURE: CPT

## 2024-03-07 PROCEDURE — 80048 BASIC METABOLIC PNL TOTAL CA: CPT

## 2024-03-07 PROCEDURE — 85025 COMPLETE CBC W/AUTO DIFF WBC: CPT

## 2024-03-07 PROCEDURE — 1090F PRES/ABSN URINE INCON ASSESS: CPT | Performed by: INTERNAL MEDICINE

## 2024-03-07 PROCEDURE — G8427 DOCREV CUR MEDS BY ELIG CLIN: HCPCS | Performed by: INTERNAL MEDICINE

## 2024-03-07 PROCEDURE — 99214 OFFICE O/P EST MOD 30 MIN: CPT | Performed by: INTERNAL MEDICINE

## 2024-03-07 PROCEDURE — G8484 FLU IMMUNIZE NO ADMIN: HCPCS | Performed by: INTERNAL MEDICINE

## 2024-03-07 PROCEDURE — G2211 COMPLEX E/M VISIT ADD ON: HCPCS | Performed by: INTERNAL MEDICINE

## 2024-03-07 PROCEDURE — 1123F ACP DISCUSS/DSCN MKR DOCD: CPT | Performed by: INTERNAL MEDICINE

## 2024-03-07 ASSESSMENT — PATIENT HEALTH QUESTIONNAIRE - PHQ9
SUM OF ALL RESPONSES TO PHQ QUESTIONS 1-9: 0
1. LITTLE INTEREST OR PLEASURE IN DOING THINGS: 0
SUM OF ALL RESPONSES TO PHQ QUESTIONS 1-9: 0

## 2024-03-07 ASSESSMENT — ENCOUNTER SYMPTOMS
SHORTNESS OF BREATH: 0
CHEST TIGHTNESS: 0

## 2024-03-07 NOTE — PROGRESS NOTES
\"Have you been to the ER, urgent care clinic since your last visit?  Hospitalized since your last visit?\"    YES - When: approximately 1  weeks ago.  Where and Why: [facility] for a fall.    “Have you seen or consulted any other health care providers outside of [facility] since your last visit?”    NO         This patient is accompanied in the office by her daughter.

## 2024-03-07 NOTE — PROGRESS NOTES
HISTORY OF PRESENT ILLNESS      Odilia Lowe is a 89 y.o. female.    BP (!) 150/58 (Site: Right Lower Arm, Position: Sitting, Cuff Size: Large Adult)   Pulse 71   Temp 96.8 °F (36 °C) (Temporal)   Resp 15   Ht 1.499 m (4' 11\")   Wt 78.9 kg (174 lb)   SpO2 95%   BMI 35.14 kg/m²         Reports her cane slipped this morning on wet cement and she fell onto a bush and sat on the ground. She had to be helped up. She denies injury.     Last week she fell also in her room. She does not remember how she got on the floor. Her daughter found her and helped her up. She says the pt was confused and did not recognize them initially.  They called 911 and the EMTs assessed her. They took her to the hospital for an evaluation        Review of Systems   Constitutional:  Negative for chills and fever.   Respiratory:  Negative for chest tightness and shortness of breath.    Cardiovascular:  Negative for chest pain and palpitations.   Genitourinary:  Positive for frequency (nocturia).   Neurological:  Negative for dizziness, weakness, numbness and headaches.           Physical Exam  Vitals and nursing note reviewed.   Constitutional:       Appearance: Normal appearance.   HENT:      Head: Normocephalic and atraumatic.   Cardiovascular:      Rate and Rhythm: Normal rate and regular rhythm.   Pulmonary:      Effort: Pulmonary effort is normal.      Breath sounds: Normal breath sounds.   Musculoskeletal:      Right lower leg: Edema present.      Left lower leg: Edema present.      Comments: Atalgic gait  Using cane   Skin:     General: Skin is warm and dry.   Neurological:      General: No focal deficit present.      Mental Status: She is alert and oriented to person, place, and time.   Psychiatric:         Mood and Affect: Mood normal.         Behavior: Behavior normal.         ASSESSMENT and PLAN      ICD-10-CM    1. Essential (primary) hypertension  I10 Basic Metabolic Panel      2. Pure hypercholesterolemia, unspecified

## 2024-07-19 ENCOUNTER — TELEPHONE (OUTPATIENT)
Facility: CLINIC | Age: 89
End: 2024-07-19

## 2024-08-04 RX ORDER — CLOPIDOGREL BISULFATE 75 MG/1
75 TABLET ORAL DAILY
Qty: 90 TABLET | Refills: 3 | Status: SHIPPED | OUTPATIENT
Start: 2024-08-04

## 2024-08-23 ENCOUNTER — CARE COORDINATION (OUTPATIENT)
Facility: CLINIC | Age: 89
End: 2024-08-23

## 2024-08-23 NOTE — CARE COORDINATION
Care Transitions Note    CTN spoke with Ms. Nunn admission Dept  of [facility]  and she informed CTN that patient is LTC at [facility] . MS. Nunn states that Pt. Is  currently at Skilled unit  but will transition back to Long term care.     No transition of care outreach indicated due to patient discharge to [facility] for Long Term Care .